# Patient Record
Sex: MALE | Race: WHITE | NOT HISPANIC OR LATINO | Employment: OTHER | URBAN - METROPOLITAN AREA
[De-identification: names, ages, dates, MRNs, and addresses within clinical notes are randomized per-mention and may not be internally consistent; named-entity substitution may affect disease eponyms.]

---

## 2017-11-22 ENCOUNTER — HOSPITAL ENCOUNTER (EMERGENCY)
Facility: MEDICAL CENTER | Age: 52
End: 2017-11-28
Attending: EMERGENCY MEDICINE
Payer: MEDICARE

## 2017-11-22 DIAGNOSIS — R45.850 HOMICIDAL IDEATION: ICD-10-CM

## 2017-11-22 DIAGNOSIS — F23 ACUTE PSYCHOSIS (HCC): ICD-10-CM

## 2017-11-22 LAB — POC BREATHALIZER: 0 PERCENT (ref 0–0.01)

## 2017-11-22 PROCEDURE — 700111 HCHG RX REV CODE 636 W/ 250 OVERRIDE (IP): Performed by: EMERGENCY MEDICINE

## 2017-11-22 PROCEDURE — 99285 EMERGENCY DEPT VISIT HI MDM: CPT

## 2017-11-22 PROCEDURE — 302970 POC BREATHALIZER: Performed by: EMERGENCY MEDICINE

## 2017-11-22 PROCEDURE — 96372 THER/PROPH/DIAG INJ SC/IM: CPT

## 2017-11-22 RX ORDER — HALOPERIDOL 5 MG/ML
5 INJECTION INTRAMUSCULAR ONCE
Status: DISCONTINUED | OUTPATIENT
Start: 2017-11-22 | End: 2017-11-22

## 2017-11-22 RX ORDER — LORAZEPAM 2 MG/ML
2 INJECTION INTRAMUSCULAR ONCE
Status: COMPLETED | OUTPATIENT
Start: 2017-11-22 | End: 2017-11-22

## 2017-11-22 RX ORDER — LORAZEPAM 2 MG/ML
2 INJECTION INTRAMUSCULAR ONCE
Status: DISCONTINUED | OUTPATIENT
Start: 2017-11-22 | End: 2017-11-22

## 2017-11-22 RX ORDER — DIPHENHYDRAMINE HYDROCHLORIDE 50 MG/ML
50 INJECTION INTRAMUSCULAR; INTRAVENOUS ONCE
Status: COMPLETED | OUTPATIENT
Start: 2017-11-22 | End: 2017-11-22

## 2017-11-22 RX ORDER — DIPHENHYDRAMINE HYDROCHLORIDE 50 MG/ML
25 INJECTION INTRAMUSCULAR; INTRAVENOUS ONCE
Status: DISCONTINUED | OUTPATIENT
Start: 2017-11-22 | End: 2017-11-22

## 2017-11-22 RX ORDER — HALOPERIDOL 5 MG/ML
5 INJECTION INTRAMUSCULAR ONCE
Status: COMPLETED | OUTPATIENT
Start: 2017-11-22 | End: 2017-11-22

## 2017-11-22 RX ADMIN — HALOPERIDOL LACTATE 5 MG: 5 INJECTION, SOLUTION INTRAMUSCULAR at 18:30

## 2017-11-22 RX ADMIN — LORAZEPAM 2 MG: 2 INJECTION INTRAMUSCULAR; INTRAVENOUS at 18:30

## 2017-11-22 RX ADMIN — DIPHENHYDRAMINE HYDROCHLORIDE 50 MG: 50 INJECTION INTRAMUSCULAR; INTRAVENOUS at 18:30

## 2017-11-23 LAB
AMPHET UR QL SCN: NEGATIVE
BARBITURATES UR QL SCN: NEGATIVE
BENZODIAZ UR QL SCN: NEGATIVE
BZE UR QL SCN: NEGATIVE
CANNABINOIDS UR QL SCN: POSITIVE
METHADONE UR QL SCN: NEGATIVE
OPIATES UR QL SCN: NEGATIVE
OXYCODONE UR QL SCN: NEGATIVE
PCP UR QL SCN: NEGATIVE
PROPOXYPH UR QL SCN: NEGATIVE

## 2017-11-23 PROCEDURE — 90791 PSYCH DIAGNOSTIC EVALUATION: CPT

## 2017-11-23 PROCEDURE — 700111 HCHG RX REV CODE 636 W/ 250 OVERRIDE (IP): Performed by: PSYCHIATRY & NEUROLOGY

## 2017-11-23 PROCEDURE — 80307 DRUG TEST PRSMV CHEM ANLYZR: CPT

## 2017-11-23 PROCEDURE — 96372 THER/PROPH/DIAG INJ SC/IM: CPT

## 2017-11-23 RX ORDER — DIPHENHYDRAMINE HYDROCHLORIDE 50 MG/ML
50 INJECTION INTRAMUSCULAR; INTRAVENOUS
Status: DISCONTINUED | OUTPATIENT
Start: 2017-11-23 | End: 2017-11-28 | Stop reason: HOSPADM

## 2017-11-23 RX ORDER — RISPERIDONE 2 MG/1
2 TABLET ORAL 2 TIMES DAILY
Status: DISCONTINUED | OUTPATIENT
Start: 2017-11-23 | End: 2017-11-28 | Stop reason: HOSPADM

## 2017-11-23 RX ORDER — LORAZEPAM 2 MG/ML
2 INJECTION INTRAMUSCULAR
Status: DISCONTINUED | OUTPATIENT
Start: 2017-11-23 | End: 2017-11-28

## 2017-11-23 RX ORDER — HALOPERIDOL 5 MG/ML
5 INJECTION INTRAMUSCULAR
Status: DISCONTINUED | OUTPATIENT
Start: 2017-11-23 | End: 2017-11-28 | Stop reason: HOSPADM

## 2017-11-23 RX ADMIN — DIPHENHYDRAMINE HYDROCHLORIDE 50 MG: 50 INJECTION, SOLUTION INTRAMUSCULAR; INTRAVENOUS at 18:05

## 2017-11-23 RX ADMIN — HALOPERIDOL LACTATE 5 MG: 5 INJECTION, SOLUTION INTRAMUSCULAR at 18:05

## 2017-11-23 RX ADMIN — LORAZEPAM 2 MG: 2 INJECTION INTRAMUSCULAR; INTRAVENOUS at 18:05

## 2017-11-23 NOTE — ED NOTES
"Patient arrives from [facility] via  deputies under legal 2000 for homicidal ideation. Per PRISCA, patient is verbally threatening to kill and shoot everyone. Patient was released from [facility] today and brought directly to [facility].     Patient is agitated and uncooperative. Verbally threatening to harm staff. Patient appears to be paranoid and is not making sense on arrival. Patient states \"you stole my identity\".   "

## 2017-11-23 NOTE — DISCHARGE PLANNING
Alert team note:  Patient is very delusional.  Legal hold extended by psychiatry.  Awaiting transfer to inpatient psychiatric hospital.

## 2017-11-23 NOTE — PROGRESS NOTES
Patient's home medications have been reviewed by the pharmacy team.     Patient's Medications    No medications on file         A:  Medications do not appear to be contributing to current complaints.   No meds per notes.    P:    No recommendations at this time. Defer to psychiatry.    Rajan Quezada, PharmD, BCPS
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
## 2017-11-23 NOTE — ED NOTES
"At approximately 1440 Tomy was offered a shower and expressed a desire for one. Upon getting to the shower in blue pod and providing Tomy with toiletries, clean garments and towels, and instructions he proceeded to pull the assistance cord while we were both in the bathroom. When asked why he pulled the help cord he replied, \"because I can. I'll do what I want.\" He was advised that such behavior would not be tolerated if he wanted a shower and that if he would not cooperate with us he would have to return to his room. He then responded that he didn't care and redressed himself in a gown, at which point he was escorted back to [place] [address] without having received a shower.   "

## 2017-11-23 NOTE — ED PROVIDER NOTES
ED Provider Note    HPI: Patient is a 52-year-old male who presented to the emergency department in custody of law enforcement. Patient is under legal hold 1st homicidal ideation. Patient has a previous history of psychosis. He is not taking any medicines at present. Patient refused to give any further history    Review of Systems: Cannot be obtained due to presenting condition    Past medical/surgical history: Psychosis    Medications:  Patient refused to answer    Allergies: None    Social History: Patient refused to answer      Physical exam: Constitutional: Slender male agitated  Vital signs:    EYES: PERRL, EOMI, Conjunctivae and sclera normal, eyelids normal bilaterally.  Neck: Trachea midline. No cervical masses seen or palpated. Normal range of motion, supple. No meningeal signs elicited.  Cardiac: Regular rate and rhythm. S1-S2 present. No S3 or S4 present. No murmurs, rubs, or gallops heard. No edema or varicosities were seen.   Lungs: Clear to auscultation with good aeration throughout. No wheezes, rales, or rhonchi heard. Patient's chest wall moved symmetrically with each respiratory effort. Patient was not making use of accessory muscles of respiration in breathing.  Abdomen: Soft nontender to palpation. No rebound or guarding elicited. No organomegaly identified. No pulsatile abdominal masses identified.   Musculoskeletal:  no  pain with palpitation or movement of muscle, bone or joint , no obvious musculoskeletal deformities identified.  Neurologic: alert and awake answers questions appropriately. Moves all four extremities independently, no gross focal abnormalities identified. Normal strength and motor.  Skin: no rash or lesion seen, no palpable dermatologic lesions identified.  Psychiatric: Agitated belligerent and threatening to kill everybody    Medical decision making: Patient was extremely agitated and threatening towards staff. He was unable to cooperate with the nursing staff and refused to  take any oral medications. For patient and staff safety patient given Haldol and Ativan and diphenhydramine (I reviewed the chart from previous emergency department visits for similar complaints and he had responded well to this medication regimen in the past)    Breathalyzer obtained; no evidence of acute alcohol intoxication tox screen pending    [employer] consulted; patient is a danger to himself and others due to psychiatric illness. He is not suitable for discharge. He'll be transferred to an inpatient psychiatric facility    Impression acute psychosis  2) homicidal ideation

## 2017-11-23 NOTE — ED PROVIDER NOTES
ED Provider Note    1:26 PM the patient has done well during my part of observation. There've been no issues. He continues to await transfer to a psychiatric facility. I anticipate evaluation today by psychiatry

## 2017-11-23 NOTE — ED NOTES
Patient resting in bed. No longer verbally threatening staff. Security remains at bedside due to HI.
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
## 2017-11-23 NOTE — DISCHARGE PLANNING
Medical Social Work    Referral: Legal Hold    Intervention: Legal Hold Paperwork given to SW by [employer] RN: Maryanne Gibbons    Legal Hold Initiated: Date: 11-22-17  Time: 1400    Legal Hold faxed: Date: 11-23-17  Time: 0500    Patient’s Insurance Listed on Face Sheet: None    Referrals sent to: [facility]    Plan: Patient will transfer to mental health facility once acceptance is obtained.

## 2017-11-23 NOTE — ED NOTES
Patient sleeping in bed. Security remains at bedside w/ direct line of sight. Respirations even and unlabored.

## 2017-11-24 NOTE — PROGRESS NOTES
Patient remains on hospital bed, resting with eyes closed. Pt lying on right side. Pt does not display any signs of discomfort or distress. Respirations equal and unlabored. Pt under constant supervision of 1:1 sitter. Continue to monitor.

## 2017-11-24 NOTE — ED NOTES
This RN received report from day shift RN. At this time patient is resting with eyes closed, respirations equal and unlabored. Continue to monitor.

## 2017-11-24 NOTE — ED PROVIDER NOTES
ED Provider Note  11/24. This is a 52-year-old male who is awaiting transfer to psychiatric facility. Currently is pleasant, cooperative, no complaints, his vital signs have remained normal except occasionally pulse is over 100.

## 2017-11-24 NOTE — PROGRESS NOTES
Pt continues to rest with eyes closed and no signs of discomfort or distress. Respirations remain equal and unlabored. Pt now lying on his back. Pt remains under constant supervision of 1:1 sitter. Continue to monitor.

## 2017-11-24 NOTE — DISCHARGE PLANNING
"ALERT note:  Mr eDsai continues to cover his head with the blanket while communicating.  \"I would appreciate a lack of deception\"......same focus/perseveration of trust/honesty.  He is still awaiting psychiatric hospital placement.  "

## 2017-11-24 NOTE — ED NOTES
Pt resting with eyes closed. Pt resting on hospital bed, lying on his back. Pt does not display any signs of discomfort or distress. Equal chest rise and fall. Pt remains under constant observation of 1:1 sitter. Continue to monitor.

## 2017-11-24 NOTE — DISCHARGE PLANNING
Alert team note:  Patient was assessed again today.  Patient continues to be irritable and very psychotic, wanting kill everyone.  Said the food was terrible and asking for something else.  Legal hold extended by psychiatry.  Awaiting transfer to an inpatient psychiatry hospital.

## 2017-11-24 NOTE — PSYCHIATRY
"PSYCHIATRIC FOLLOW UP:    Reason for Admission: from care home via  deputies under legal 2000 for homicidal ideation. Per PRISCA, patient is verbally threatening to kill and shoot everyone. Patient was released from care home today and brought directly to [facility]...... paranoid and is not making sense on arrival. Patient states \"you stole my identity\".     Legal hold status:+        Refused meds. Says his real name is \"Alexandr Soto\", there are a lot of Alexandr Soto's. Something else which was non sensical. Would like a shower, nails trimmed, beard shaved. This was tried earlier am but while in the shower he pulled a cord, became agitated and hostile. Prn'd. Asked why, \"because I can do what I want\".    Psychiatric Examination: observed phenomenon:  Vitals:Blood pressure 116/84, pulse 88, temperature 37.1 °C (98.7 °F), resp. rate 18, height 1.702 m (5' 7\"), weight 70.3 kg (155 lb), SpO2 97 %.  Musculoskeletal(abnormal movements, gait, etc): none noted but gait not observed.  Appearance:rendon, fair hygiene. Good eye contact until he covered his head (again)and refused to participate further.  Thoughts:  psychotic  Speech: minimal    Mood: not identified  Affect: blunted and not willing to cooperate  SI/HI: no answer  Attention/Alertness:  intact   Memory: grossly intact  Orientation: grossly intact  Fund of Knowledge: unable to explore  Insight/Judgement into symptoms: none     Assessment:(acuity level)  Psychotic Disorder Unsepc: highly likely to be schizophrenia. He does not show excess motor activity, verbal activity, pressured speech. R/O substance induced.          Plan:no changes.   legal hold:extended  Anticipated F/U: within 48 hours.     Will follow             "

## 2017-11-24 NOTE — PROGRESS NOTES
Patient requesting breakfast. This RN educated patient that breakfast trays will be coming in few hours. Patient given lunch box and water to drink. Pt calm and cooperative.

## 2017-11-24 NOTE — PROGRESS NOTES
Patient remains on hospital bed, resting with eyes closed. Pt lying right side. Pt does not display any signs of discomfort or distress. Respirations equal and unlabored. Pt under constant supervision of 1:1 sitter. Continue to monitor.

## 2017-11-24 NOTE — ED NOTES
"Pt demanding \"real breakfast\"; \"I don't know what birds you have been feeding here, but I can't eat just bread.  I need something else!\"  "

## 2017-11-24 NOTE — ED NOTES
Pt continues to rest with eyes closed and no signs of discomfort or distress. Respirations remain equal and unlabored. Pt now lying his back. Pt remains under constant supervision of this RN, ER tech and . Continue to monitor.

## 2017-11-24 NOTE — DISCHARGE PLANNING
Medical Social Work  SW completed and faxed Legal Hold Extention request to Randa Wayne office.  Message left for Gabriela Preston to notify of paperwork.

## 2017-11-24 NOTE — ED NOTES
"Pt yelling out of the room that he wants his discharge paperwork. Pt pacing in room and is agitated. Security called to bedside, states the staff are all \"rapists\" and states we are injecting him \"with trace elements\". Pt cooperative with IM injections.   "

## 2017-11-24 NOTE — DISCHARGE PLANNING
MSW spoke with Chan at [facility] who stated that they are full today. Possible discharges tomorrow morning.

## 2017-11-24 NOTE — ED NOTES
Patient is responsive to verbal stimuli. Patient is calm and cooperative at this time. Patient continues to rest with eyes closed. Continue to monitor.

## 2017-11-24 NOTE — ED NOTES
Patient ambulated to and from bathroom. Pt does not display any distress. Pt calm and cooperative.

## 2017-11-25 PROCEDURE — 700111 HCHG RX REV CODE 636 W/ 250 OVERRIDE (IP): Performed by: PSYCHIATRY & NEUROLOGY

## 2017-11-25 PROCEDURE — 96372 THER/PROPH/DIAG INJ SC/IM: CPT

## 2017-11-25 RX ADMIN — HALOPERIDOL LACTATE 5 MG: 5 INJECTION, SOLUTION INTRAMUSCULAR at 21:31

## 2017-11-25 RX ADMIN — DIPHENHYDRAMINE HYDROCHLORIDE 50 MG: 50 INJECTION, SOLUTION INTRAMUSCULAR; INTRAVENOUS at 21:31

## 2017-11-25 RX ADMIN — LORAZEPAM 2 MG: 2 INJECTION INTRAMUSCULAR; INTRAVENOUS at 21:31

## 2017-11-25 NOTE — DISCHARGE PLANNING
"Pt laying quietly in bed.  Pleasant upon approach.  Good eye contact.  Denies suicidal and homicidal ideation; however, when asked about the reason he was brought into the hospital, pt stated \"I do not know why.\"  When questioned about prior homicidal ideation, pt responded, \" Do you know what national emergency within the police department is?\" Pt furthered explained, \"it is when a situation occurs and a execution needs to take place.  That can either be done by firing squad or hanging.  All you have to remember is court marshal.\"   When asked about previous psychiatric hospitalization, Pt responded, \"no never.\"  Pt started to become paranoid with further questions, stating , \"lets keep that information just between us. That's all you need to know.\"  Pt agreed to inform staff if he felt homicidal or suicidal. Pt still awaiting placement into a inpatient psychiatric hospital.  Will continue to be monitored 1:1 with patient sitter for safety.    "

## 2017-11-25 NOTE — ED NOTES
"Pt yelling in room, blanket over face. Checked on pt, yelled at me to \"get out\".  Asked not to yell. Will cont to monitor.  "

## 2017-11-25 NOTE — ED NOTES
"Pt refused BKFST,  Pt refuses risperidone.  \" I don't take risperidone!. breakfst was just bread, that has no nutritional value, just send me back to longterm, I'm tired of this B---S---\".  "

## 2017-11-25 NOTE — ED NOTES
Pt yelling in room; speaking loudly to sitter about not receiving lunch; pt told to keep voice down by Rn, only warning

## 2017-11-25 NOTE — ED NOTES
"Pt requesting something for sleep, PRN ativan brought to pt, pt states \"pshh, take your aspirin and get out of here.\" Re-stated that medicated was ativan, pt stated \"I know what that is, that's just aspirin\" and waved nurse away stating \"this is bullshit.\" Pt laid back down on gurney.   "

## 2017-11-25 NOTE — ED PROVIDER NOTES
"ED PROVIDER NOTE    Scribed for Rodolfo Baldwin M.D. by Jody Guadarrama. 11/25/2017, 12:33 PM.    This is an addendum to the note on Tomy Desai. For further details and full chart entry, see the previously signed ED Provider Note written by Dr. Delatorre ([facility]).      12:33 PM Patient reevaluated at bedside. Patient has no complaints at this time. His last set of vitals were: /89   Pulse 80   Temp 36.7 °C (98.1 °F)   Resp 16   Ht 1.702 m (5' 7\")   Wt 70.3 kg (155 lb)   SpO2 96%   BMI 24.28 kg/m²      Patient is awake alert without any complaints still awaiting transfer to a psychiatric facility.      IJody (Scribe), am scribing for, and in the presence of, Rodolfo Baldwin M.D..  Electronically signed by: Jody Guadarrama (Scribe), 11/25/2017  IRodolfo M.D. personally performed the services described in this documentation, as scribed by Jody Guadarrama in my presence, and it is both accurate and complete.    The note accurately reflects work and decisions made by me.  Rodolfo Baldwin  11/25/2017  1:33 PM        "

## 2017-11-25 NOTE — ED NOTES
Pt sleeping on gurney, visible rise and fall of chest, sitter in direct observation outside of room

## 2017-11-26 PROCEDURE — 96372 THER/PROPH/DIAG INJ SC/IM: CPT

## 2017-11-26 PROCEDURE — 700111 HCHG RX REV CODE 636 W/ 250 OVERRIDE (IP): Performed by: PSYCHIATRY & NEUROLOGY

## 2017-11-26 RX ADMIN — HALOPERIDOL LACTATE 5 MG: 5 INJECTION, SOLUTION INTRAMUSCULAR at 20:22

## 2017-11-26 RX ADMIN — LORAZEPAM 2 MG: 2 INJECTION INTRAMUSCULAR; INTRAVENOUS at 20:22

## 2017-11-26 RX ADMIN — DIPHENHYDRAMINE HYDROCHLORIDE 50 MG: 50 INJECTION, SOLUTION INTRAMUSCULAR; INTRAVENOUS at 20:22

## 2017-11-26 ASSESSMENT — PAIN SCALES - GENERAL: PAINLEVEL_OUTOF10: 0

## 2017-11-26 ASSESSMENT — LIFESTYLE VARIABLES: DO YOU DRINK ALCOHOL: NO

## 2017-11-26 NOTE — ED NOTES
Patient appears to be sleeping in bed. Respirations even and unlabored. No s/s of acute distress.
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
## 2017-11-26 NOTE — ED NOTES
"Patient refused PM medications stating, \"Don't you know there's a law suit out against that? I don't take any medications ever, now get the fuck out of my room you piece of shit!\"  "

## 2017-11-26 NOTE — ED NOTES
PRN sedatives administered w/ 2 RNs and 3 security officers assisting. Patient continues to get OOB and press CODE BLUE button on wall.

## 2017-11-26 NOTE — ED NOTES
Patient agitated and hitting code blue button on wall screaming at staff. Security called to assist administration of PRN sedatives.
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
## 2017-11-26 NOTE — ED PROVIDER NOTES
ED Provider Note    Patient is resting quietly at this time, and awaits transfer to psychiatric facility.

## 2017-11-27 ASSESSMENT — PAIN SCALES - GENERAL: PAINLEVEL_OUTOF10: 0

## 2017-11-27 NOTE — ED NOTES
Patient sleeping w/ visible rise and fall of chest; even unlabored respirations. Patient in direct view of nurse's station.

## 2017-11-27 NOTE — DISCHARGE PLANNING
S) this is not hospital, you are holding me against my will.    O) patient referencing multiple conspiracies keeping him in hospital.  Angry and refusing to speak more, telling staff to leave room.  A) psychotic  P) continue hold.

## 2017-11-27 NOTE — ED PROVIDER NOTES
ED Provider Note    Date and Time Temp Temp Source Pulse Heart Rate (Monitored) Resp BP NIBP SpO2 Room air O2 Patient BP Position BP Location O2 (LPM) O2 Delivery 5 User   11/27/17 1056 36.7 °C (98.1 °F) -- 87 -- 12 101/65 -- 96 % -- -- -- -- -- -- MTA   11/27/17 0614 36.9 °C (98.5 °F) Temporal 88 -- 16  99/68 -- 96 % -- Lying Left Side Right;Upper Arm -- -- -- ASK     Patient did become somewhat agitated here. He did not require any chemical restraints. We are still awaiting acceptance at a psychiatric facility

## 2017-11-27 NOTE — ED NOTES
"Upon walking into patient's room patient states \"speaking of white supremacists you're right on time, get the fuck out\". Patient laying w/ blanket over self, not attempting to get out of bed but verbally aggressive and uncooperative. Will continue to monitor; patient in direct view of nurse's station.   "

## 2017-11-27 NOTE — ED NOTES
Patient resting comfortably; VSS at this time. Patient cooperative with taking VS; denies further needs at this time.

## 2017-11-27 NOTE — ED NOTES
Pt requesting shower, security called; states will attempt to take pt for shower at 1400 when staff available

## 2017-11-27 NOTE — ED NOTES
"Pt amb to desk, demanding to be placed under Alexandr Soto, states \"my dinner ticket better say Alexandr Soto from now on\". Pt amb back to room  "

## 2017-11-27 NOTE — ED NOTES
Pt refusing to take scheduled 2100 medications, but agreed to vital signs. Patient then walked out of room and started yelling and cussing. Patient medicated per MAR for agitation w/ security, ED Tech, and RN at the bedside. Security monitoring patient who is laying in bed calmly at this time.

## 2017-11-27 NOTE — ED NOTES
Patient sleeping w/ visible rise and fall of chest; even unlabored respirations observed. Patient in direct view of nurse's station.

## 2017-11-27 NOTE — ED NOTES
Pt standing in door of room shouting that he better have a razor to shower, security instructed pt to remain in room and stop shouting, pt cussed security out and went back into room, shutting door.

## 2017-11-27 NOTE — ED NOTES
Patient ambulatory to restroom and back to room; patient requests food and drink; Patient given box of food and water per request. Patient calm and cooperative at this time.

## 2017-11-28 VITALS
BODY MASS INDEX: 24.33 KG/M2 | WEIGHT: 155 LBS | OXYGEN SATURATION: 96 % | SYSTOLIC BLOOD PRESSURE: 122 MMHG | HEIGHT: 67 IN | HEART RATE: 72 BPM | TEMPERATURE: 98.4 F | RESPIRATION RATE: 16 BRPM | DIASTOLIC BLOOD PRESSURE: 86 MMHG

## 2017-11-28 PROCEDURE — 96372 THER/PROPH/DIAG INJ SC/IM: CPT

## 2017-11-28 PROCEDURE — 700111 HCHG RX REV CODE 636 W/ 250 OVERRIDE (IP): Performed by: PSYCHIATRY & NEUROLOGY

## 2017-11-28 RX ORDER — BENZTROPINE MESYLATE 1 MG/ML
1 INJECTION INTRAMUSCULAR; INTRAVENOUS 2 TIMES DAILY PRN
Status: DISCONTINUED | OUTPATIENT
Start: 2017-11-28 | End: 2017-11-28 | Stop reason: HOSPADM

## 2017-11-28 RX ADMIN — LORAZEPAM 2 MG: 2 INJECTION INTRAMUSCULAR; INTRAVENOUS at 00:48

## 2017-11-28 RX ADMIN — DIPHENHYDRAMINE HYDROCHLORIDE 50 MG: 50 INJECTION, SOLUTION INTRAMUSCULAR; INTRAVENOUS at 02:48

## 2017-11-28 RX ADMIN — LORAZEPAM 2 MG: 2 INJECTION INTRAMUSCULAR; INTRAVENOUS at 02:48

## 2017-11-28 ASSESSMENT — PAIN SCALES - GENERAL
PAINLEVEL_OUTOF10: 0
PAINLEVEL_OUTOF10: 0

## 2017-11-28 NOTE — ED NOTES
REMSA at bedside to take pt to [facility].  PT's one bag of belongings with pt.

## 2017-11-28 NOTE — DISCHARGE PLANNING
"Pt laying in bed.  Denies suicidal ideation.  Labile mood.  Reports sleeping on and off but no \"real rest.\"  Speech clear.  Good eye contact.  Continues to be paranoid.  Fixation on government and feels he must get revenge.  Still awaiting placement to inpatient hospital. Will continue 1:1 observation for safety.    "

## 2017-11-28 NOTE — ED PROVIDER NOTES
ED Provider Note Addendum    Scribed for Tay Salcedo M.D. by Moises Kent on 11/28/2017 at 11:06 AM.     This is an addendum to the note on Tomy Desai.  For further details and full chart information, see patient's initial note.       4;00 AM - I discussed the patient's case with Dr. Tolentino ([facility]) who will transfer care of the patient to me at this time.        11:07 AM  - Patient evaluated by myself.  Patient is resting comfortably at this time with no complaints.    Disposition:  Patient will be transferred to an appropriate psychiatric facility in stable condition for further psychiatric care and evaluation.  Patient will be placed under the care of my partner awaiting transfer.    FINAL IMPRESSION  1. Acute psychosis    2. Homicidal ideation         IMoises (Scribe), am scribing for, and in the presence of, Tay Salcedo M.D..    Electronically signed by: Moises Kent (Scribe), 11/28/2017    ITay M.D. personally performed the services described in this documentation, as scribed by Moiess Kent in my presence, and it is both accurate and complete.    The note accurately reflects work and decisions made by me.  Tay Salcedo  11/28/2017  11:24 AM

## 2017-11-28 NOTE — ED NOTES
Patient to nurses station with multiple requests.  Given water.  Given blanket.  Asked for food, repeatedly.  Informed patient on plan of care and time of night.

## 2017-11-28 NOTE — ED NOTES
"PT demanding more food, pt updated that he will not be getting \"extra meals\" at this time.  PT then stated \"you are required to feed me 2000 calories 3 times a day and that breakfast was way less than 2000 calories\"    "

## 2017-11-28 NOTE — PSYCHIATRY
PSYCHIATRIC FOLLOW UP:    Reason for Admission:   Legal hold status:     Psychiatric Supervising Attending:         HPI:       Brought from detention for HI. Had been threatening to shoot and kill everyone. After introducing myself he immediately starts screaming and calling me a quack. Paranoid. Refusing medications, but requiring haldol prns. Not redirectable. Delusional.     Psychiatric Examination: observed phenomenon:  Vitals:   Vitals:    11/27/17 0201 11/27/17 0614 11/27/17 1056 11/27/17 1419   BP: 121/54 (!) 99/68 101/65 124/90   Pulse: 87 88 87 89   Resp: 16 16 12 15   Temp: 36.5 °C (97.7 °F) 36.9 °C (98.5 °F) 36.7 °C (98.1 °F)    TempSrc:   Temporal    SpO2: 96% 96% 96% 96%   Weight:       Height:           Musculoskeletal(abnormal movements, gait, etc): psychomotor agitation   Appearance: hygiene fair   Thoughts: psychotic   Speech: disorganized   Mood:    Angry   Affect:    Labile   SI/HI:   did't answer   Attention/Alertness:   Poor attention   Memory:    Grossly intact.   Orientation:    Grossly intact.   Fund of Knowledge:      Insight/Judgement into symptoms poor  Neurological Testing:( ie clock, cube drawing, MMSE, MOCA,etc.)         Assessment:(acuity level)  Psychotic disorder unspecified           Plan:    Not taking pych meds, so no point in increasing.      Continue haldol prns  Transfer to psychiatric hospital when bed available.

## 2017-11-28 NOTE — ED NOTES
"Attempted to medicate pt, pt refused.  PT stated \"there must be some mistake I do not take medication.  I refuse to take anything that is not a sleeping medication\"  "

## 2017-11-28 NOTE — ED NOTES
"Pt walked from the bathroom, gait steady, yelling at anyone in sight. Called the security guards \"knuckleheads.\"  Lying back in bed, security on standby.  "

## 2017-11-28 NOTE — ED NOTES
"Pt standing in the window, without clothing, intermittently yelling outside the door about \"classifications.\"  "
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
## 2017-11-28 NOTE — ED NOTES
"Patient requesting something to \"help me sleep. Like a little birdie bath treat.\"  Medicated with Ativan.  Patient cooperative.  Sitter outside doorway.    "

## 2017-11-28 NOTE — DISCHARGE PLANNING
Pt has been accepted to [facility] by Dr. Faustin.  Teri called [facility] and set up transport through [place] for 1500.  Sw updated [facility], bedside rn, and completed transfer packet.

## 2017-11-29 NOTE — DISCHARGE PLANNING
Medical Social Work    Received pt's legal hold extension from 's office; copy faxed to [facility] and copy placed in medical records folder.

## 2018-08-22 ENCOUNTER — HOSPITAL ENCOUNTER (EMERGENCY)
Dept: HOSPITAL 8 - ED | Age: 53
Discharge: HOME | End: 2018-08-22
Payer: COMMERCIAL

## 2018-08-22 VITALS — SYSTOLIC BLOOD PRESSURE: 142 MMHG | DIASTOLIC BLOOD PRESSURE: 111 MMHG

## 2018-08-22 VITALS — HEIGHT: 69 IN | BODY MASS INDEX: 20.9 KG/M2 | WEIGHT: 141.1 LBS

## 2018-08-22 DIAGNOSIS — Z77.21: Primary | ICD-10-CM

## 2018-08-22 PROCEDURE — 87340 HEPATITIS B SURFACE AG IA: CPT

## 2018-08-22 PROCEDURE — 36415 COLL VENOUS BLD VENIPUNCTURE: CPT

## 2018-08-22 PROCEDURE — 86803 HEPATITIS C AB TEST: CPT

## 2018-08-22 PROCEDURE — G0475 HIV COMBINATION ASSAY: HCPCS

## 2018-08-22 PROCEDURE — 86706 HEP B SURFACE ANTIBODY: CPT

## 2018-08-22 PROCEDURE — 86705 HEP B CORE ANTIBODY IGM: CPT

## 2018-08-22 PROCEDURE — 99284 EMERGENCY DEPT VISIT MOD MDM: CPT

## 2018-08-22 PROCEDURE — 87806 HIV AG W/HIV1&2 ANTB W/OPTIC: CPT

## 2020-04-03 ENCOUNTER — HOSPITAL ENCOUNTER (EMERGENCY)
Facility: MEDICAL CENTER | Age: 55
End: 2020-04-05
Attending: EMERGENCY MEDICINE
Payer: MEDICARE

## 2020-04-03 LAB — ETHANOL BLD-MCNC: <10.1 MG/DL (ref 0–10.1)

## 2020-04-03 PROCEDURE — 700111 HCHG RX REV CODE 636 W/ 250 OVERRIDE (IP): Performed by: EMERGENCY MEDICINE

## 2020-04-03 PROCEDURE — 96375 TX/PRO/DX INJ NEW DRUG ADDON: CPT

## 2020-04-03 PROCEDURE — 700101 HCHG RX REV CODE 250

## 2020-04-03 PROCEDURE — 304217 HCHG IRRIGATION SYSTEM

## 2020-04-03 PROCEDURE — 90791 PSYCH DIAGNOSTIC EVALUATION: CPT

## 2020-04-03 PROCEDURE — 304999 HCHG REPAIR-SIMPLE/INTERMED LEVEL 1

## 2020-04-03 PROCEDURE — 99285 EMERGENCY DEPT VISIT HI MDM: CPT

## 2020-04-03 PROCEDURE — 80307 DRUG TEST PRSMV CHEM ANLYZR: CPT

## 2020-04-03 PROCEDURE — 303747 HCHG EXTRA SUTURE

## 2020-04-03 PROCEDURE — 96374 THER/PROPH/DIAG INJ IV PUSH: CPT

## 2020-04-03 PROCEDURE — 302970 POC BREATHALIZER: Performed by: EMERGENCY MEDICINE

## 2020-04-03 RX ORDER — LIDOCAINE HYDROCHLORIDE AND EPINEPHRINE 10; 10 MG/ML; UG/ML
INJECTION, SOLUTION INFILTRATION; PERINEURAL
Status: COMPLETED
Start: 2020-04-03 | End: 2020-04-03

## 2020-04-03 RX ORDER — HALOPERIDOL 5 MG/ML
5 INJECTION INTRAMUSCULAR ONCE
Status: COMPLETED | OUTPATIENT
Start: 2020-04-03 | End: 2020-04-03

## 2020-04-03 RX ORDER — DIPHENHYDRAMINE HYDROCHLORIDE 50 MG/ML
50 INJECTION INTRAMUSCULAR; INTRAVENOUS ONCE
Status: COMPLETED | OUTPATIENT
Start: 2020-04-03 | End: 2020-04-03

## 2020-04-03 RX ORDER — LORAZEPAM 2 MG/ML
2 INJECTION INTRAMUSCULAR ONCE
Status: COMPLETED | OUTPATIENT
Start: 2020-04-03 | End: 2020-04-03

## 2020-04-03 RX ADMIN — HALOPERIDOL LACTATE 5 MG: 5 INJECTION, SOLUTION INTRAMUSCULAR at 18:37

## 2020-04-03 RX ADMIN — DIPHENHYDRAMINE HYDROCHLORIDE 50 MG: 50 INJECTION INTRAMUSCULAR; INTRAVENOUS at 18:37

## 2020-04-03 RX ADMIN — LIDOCAINE HYDROCHLORIDE,EPINEPHRINE BITARTRATE: 10; .01 INJECTION, SOLUTION INFILTRATION; PERINEURAL at 14:15

## 2020-04-03 RX ADMIN — LORAZEPAM 2 MG: 2 INJECTION INTRAMUSCULAR; INTRAVENOUS at 17:58

## 2020-04-03 NOTE — ED TRIAGE NOTES
"BIB EMS and PD. Per EMS pt reported he had not slept in 3 days, was hearing voices and inflicted self harm by cutting right knuckles with glass. In addition PD reported pt has homicidal ideation and was acting aggressively toward PD and EMS. Received 300 IM ketamine and 2 IV Versed PTA. Pt arrived in 4 point restraints.     Chart up for ERP    /103   Pulse (!) 102   Temp 37 °C (98.6 °F) (Temporal)   Resp 14   Ht 1.702 m (5' 7\")   Wt 70.8 kg (156 lb)   SpO2 92%   BMI 24.43 kg/m²     "

## 2020-04-03 NOTE — ED NOTES
"Pt yelling, cursing at staff, \"Fuck you. Suck my sumeet.\" Verbally abusive to staff, delusional at times.Continues to spit on mayo, self and bed. Pt remains in restraints. Sitter in lone of sight.  "

## 2020-04-03 NOTE — ED PROVIDER NOTES
ED Provider Note    CHIEF COMPLAINT  Chief Complaint   Patient presents with   • Legal 2000   • Delusional       HPI  Tomy Desai is a 54 y.o. male who presents to the emergency department with complaint of suicidal behavior, combative behavior.  The patient was found in the bathroom, broke glass and try to cut himself instead he went to kill himself.  EMS was called, the patient received ketamine and Versed in route secondary to his agitation.  Here in the emergency department, he is screaming at us, he is in four-point restraints he is stating that he wants to kill himself.  Denies chest pain, nausea, vomiting    REVIEW OF SYSTEMS  Difficult to obtain secondary to agitation    PAST MEDICAL HISTORY  No past medical history on file.    FAMILY HISTORY  Noncontributory    SOCIAL HISTORY  Social History     Socioeconomic History   • Marital status: Single     Spouse name: Not on file   • Number of children: Not on file   • Years of education: Not on file   • Highest education level: Not on file   Occupational History   • Not on file   Social Needs   • Financial resource strain: Not on file   • Food insecurity     Worry: Not on file     Inability: Not on file   • Transportation needs     Medical: Not on file     Non-medical: Not on file   Tobacco Use   • Smoking status: Not on file   Substance and Sexual Activity   • Alcohol use: Not on file   • Drug use: Not on file   • Sexual activity: Not on file   Lifestyle   • Physical activity     Days per week: Not on file     Minutes per session: Not on file   • Stress: Not on file   Relationships   • Social connections     Talks on phone: Not on file     Gets together: Not on file     Attends Congregation service: Not on file     Active member of club or organization: Not on file     Attends meetings of clubs or organizations: Not on file     Relationship status: Not on file   • Intimate partner violence     Fear of current or ex partner: Not on file     Emotionally  "abused: Not on file     Physically abused: Not on file     Forced sexual activity: Not on file   Other Topics Concern   • Not on file   Social History Narrative   • Not on file       SURGICAL HISTORY  No past surgical history on file.    CURRENT MEDICATIONS  Home Medications    **Home medications have not yet been reviewed for this encounter**         ALLERGIES  No Known Allergies    PHYSICAL EXAM  VITAL SIGNS: /103   Pulse (!) 102   Temp 37 °C (98.6 °F) (Temporal)   Resp 14   Ht 1.702 m (5' 7\")   Wt 70.8 kg (156 lb)   SpO2 92%   BMI 24.43 kg/m²      Constitutional: Well developed, Well nourished, mild distress, Non-toxic appearance.   Eyes: PERRLA, EOMI, Conjunctiva normal, No discharge.   Cardiovascular: Normal heart rate, Normal rhythm, No murmurs, No rubs, No gallops, and intact distal pulses.   Thorax & Lungs:  No respiratory distress, no rales, no rhonchi, No wheezing, No chest wall tenderness.   Abdomen: Bowel sounds normal, Soft, No tenderness, No guarding, No rebound, No pulsatile masses.   Skin: Laceration to the extensor surface of the index finger as well as ring finger at the base of the phalanx, no tendon involvement, no active bleeding  Extremities: Lacerations as above   neurologic: Alert & oriented x 3, agitated yet responding to verbal stimulus, occasionally acting out and screaming, sensation and strength intact upper lower extremes bilaterally   psychiatric: Agitated, anxious    Results for orders placed or performed during the hospital encounter of 04/03/20   DIAGNOSTIC ALCOHOL   Result Value Ref Range    Diagnostic Alcohol <10.1 0.0 - 10.1 mg/dL       Laceration Repair Procedure Note    Indication: Laceration    Procedure: The patient was placed in the appropriate position and anesthesia around the laceration was obtained by infiltration using 1% Lidocaine with epinephrine. The area was then irrigated with normal saline. The laceration was closed with 4-0 Ethilon using interrupted " sutures. A second laceration was closed with 4-0 Ethilon using interrupted sutures. The wound area was then dressed with a sterile dressing.      Total repaired wound length: 2.5 cm.     Other Items: Suture count: 6    The patient tolerated the procedure well.    Complications: None      COURSE & MEDICAL DECISION MAKING  Pertinent Labs & Imaging studies reviewed. (See chart for details)  This is a 54-year-old male well-known to us for his psychosis.  Here in the emergency department, the patient extremely agitated.  He received Ativan 2 mg, Haldol 5 mg as well as Benadryl 50 mg IV.  The patient became more sedate.  He has been threatening, spitting and very uncooperative therefore he is been placed in hard restraints.  Nu with life skills has evaluate the patient I do believe the patient requires further evaluation and management and transfer to a local psychiatric facility.  The patient is medically stable this point for transfer.    FINAL IMPRESSION  Acute psychosis  Hand lacerations      Electronically signed by: Evan Bejarano D.O., 4/3/2020 2:37 PM

## 2020-04-03 NOTE — ED NOTES
"Pt impulsive, yelling out. Pt denies SI/HI at present. Pt states \"why would I want to hurt myself? Why would I do that?\" Sitter in line of sight.  "

## 2020-04-04 PROCEDURE — 99284 EMERGENCY DEPT VISIT MOD MDM: CPT | Mod: GC | Performed by: PSYCHIATRY & NEUROLOGY

## 2020-04-04 RX ORDER — HALOPERIDOL 5 MG/ML
5 INJECTION INTRAMUSCULAR
Status: DISCONTINUED | OUTPATIENT
Start: 2020-04-04 | End: 2020-04-06 | Stop reason: HOSPADM

## 2020-04-04 RX ORDER — HALOPERIDOL 5 MG/1
5 TABLET ORAL
Status: DISCONTINUED | OUTPATIENT
Start: 2020-04-04 | End: 2020-04-06 | Stop reason: HOSPADM

## 2020-04-04 RX ORDER — RISPERIDONE 2 MG/1
2 TABLET ORAL 2 TIMES DAILY
Status: DISCONTINUED | OUTPATIENT
Start: 2020-04-04 | End: 2020-04-06 | Stop reason: HOSPADM

## 2020-04-04 RX ORDER — LORAZEPAM 2 MG/1
2 TABLET ORAL
Status: DISCONTINUED | OUTPATIENT
Start: 2020-04-04 | End: 2020-04-06 | Stop reason: HOSPADM

## 2020-04-04 RX ORDER — DIPHENHYDRAMINE HYDROCHLORIDE 50 MG/ML
50 INJECTION INTRAMUSCULAR; INTRAVENOUS
Status: DISCONTINUED | OUTPATIENT
Start: 2020-04-04 | End: 2020-04-06 | Stop reason: HOSPADM

## 2020-04-04 RX ORDER — LORAZEPAM 2 MG/ML
2 INJECTION INTRAMUSCULAR
Status: DISCONTINUED | OUTPATIENT
Start: 2020-04-04 | End: 2020-04-06 | Stop reason: HOSPADM

## 2020-04-04 RX ORDER — DIPHENHYDRAMINE HCL 25 MG
50 TABLET ORAL
Status: DISCONTINUED | OUTPATIENT
Start: 2020-04-04 | End: 2020-04-06 | Stop reason: HOSPADM

## 2020-04-04 NOTE — ED NOTES
Attempted to obtain vital signs at this time, pt. Refused. Pt. Verbalized refusal, pt. Able to reposition independently in bed.

## 2020-04-04 NOTE — ED PROVIDER NOTES
ED Provider Note Addendum    Scribed for Omari Kevin M.D. by Jn Alexis on 4/4/2020 at 6:24 AM.     This is an addendum to the note on Tomy Desai.  For further details and full chart information, see patient's initial note.       6:18 AM - I discussed the patient's case with Dr. Shi ([facility]) who will transfer care of the patient to me at this time.        6:24 AM  - Patient evaluated by myself.  Patient is resting comfortably at this time with no complaints. Vital signs stable, no acute distress, alert, will continue to monitor.    Disposition:  Patient will be transferred to an appropriate psychiatric facility in stable condition for further psychiatric care and evaluation.  Patient will be placed under the care of my partner awaiting transfer.     Jn GARCIA (Scribe), am scribing for, and in the presence of, Omari Kevin M.D..    Electronically signed by: Jn Alexis (Scribe), 4/4/2020    Omari GARCIA M.D. personally performed the services described in this documentation, as scribed by Jn Alexis in my presence, and it is both accurate and complete.    The note accurately reflects work and decisions made by me.  Omari Kevin M.D.  4/16/2020  5:33 PM

## 2020-04-04 NOTE — DISCHARGE PLANNING
MSW spoke to Geri at [facility]. They are unable to verify pt's Medicare days at this time. Will update MSW if they can accept pt.

## 2020-04-04 NOTE — ED NOTES
Pt medicated and sleeping at this time. Unable to perform [place] reassessment questions at this time will reassess

## 2020-04-04 NOTE — ED NOTES
"Pt cursing at staff, thinks he's in [facility], \"This is not a hospital.\"    "

## 2020-04-04 NOTE — ED NOTES
Pharmacy Medication Reconciliation        Unable to complete Medication Reconciliation at this time. Pt is not able to participate in interview.   No pharmacy on file and no family at bedside.

## 2020-04-04 NOTE — CONSULTS
"[facility]   TRIAGE ASSESSMENT    Name: Tomy Desai  MRN: [id number]  : 1965  Age: 54 y.o.  Date of assessment: 4/3/2020  PCP: Pcp Pt States None  Persons in attendance: Patient    CHIEF COMPLAINT/PRESENTING ISSUE (as stated by patient): 54 year old male BIB EMS and PD today with self-inflicted lacerations to right knuckles requiring sutures; per [facility] ED RN note, \"pt has homicidal ideation and was acting aggressively toward PD and EMS. Received 300 IM ketamine and 2 IV Versed PTA. Pt arrived in 4 point restraints\"; pt verbally aggressive towards [facility] ED staff, spitting; received Ativan 2 mg IV today at 1758; with noted h/o [facility] ED visit 17 after arrest, [facility] \"brought from [facility] for HI. Had been threatening to shoot and kill everyone\" and noted 16 visit, pt \" yelling and threatening security\"; currently, pt with delusional rambling, states he is at the ED because \"of a home invasion\" and \"you can't steal something I don't have:; paranoid; repeating \"cannot disclose\" to many evaluation questions asked; when asked about outpt  providers, pt states \"I don't believe in medical\"; noted psych diagnosis includes Psychotic disorder unspecified; previous inpt  tx at [facility] 2017, 2016; pt denies substance use; states he has a residence but will not answer further      Chief Complaint   Patient presents with   • Legal 2000   • Delusional        CURRENT LIVING SITUATION/SOCIAL SUPPORT: states he has a residence but will not answer further    BEHAVIORAL HEALTH TREATMENT HISTORY  Does patient/parent report a history of prior behavioral health treatment for patient?   Yes:    Dates Level of Care Facilty/Provider Diagnosis/Problem Medications   [facility], Haldol, Ativan, Benadryl         SAFETY ASSESSMENT - SELF  Does patient acknowledge current or past symptoms of dangerousness to self? Yes-self-inflicted lacerations to right knuckles requiring " "sutures today  Does parent/significant other report patient has current or past symptoms of dangerousness to self? N\A  Does presenting problem suggest symptoms of dangerousness to self? Yes:     Past Current    Suicidal Thoughts: []  []    Suicidal Plans: []  []    Suicidal Intent: []  []    Suicide Attempts: []  []    Self-Injury []  [x]      For any boxes checked above, provide detail: self-inflicted lacerations to right knuckles requiring sutures;    History of suicide by family member: Unknown, pt refusing to answer  History of suicide by friend/significant other: Unknown, pt refusing to answer  Recent change in frequency/specificity/intensity of suicidal thoughts or self-harm behavior? Unknown, pt refusing to answer  Current access to firearms, medications, or other identified means of suicide/self-harm? Unknown, pt refusing to answer  If yes, willing to restrict access to means of suicide/self-harm? Unknown, pt refusing to answer  Protective factors present:  Unknown, pt refusing to answer    SAFETY ASSESSMENT - OTHERS  Does patient acknowledge current or past symptoms of aggressive behavior or risk to others? Yes-today \"pt has homicidal ideation and was acting aggressively toward PD and EMS. Received 300 IM ketamine and 2 IV Versed PTA. Pt arrived in 4 point restraints\"; pt verbally aggressive towards [facility] ED staff, spitting; received Ativan 2 mg IV today at 1758; with noted h/o [facility] ED visit 11/23/17 after arrest, detention \"brought from detention for HI. Had been threatening to shoot and kill everyone\" and noted 5/31/16 visit, pt \" yelling and threatening security  Does parent/significant other report patient has current or past symptoms of aggressive behavior or risk to others?  N\A  Does presenting problem suggest symptoms of dangerousness to others? Yes:    History Current   Thoughts of injuring others? [x]  [x]    Threats to injure others? [x]  [x]    Plan to injure others? []  []    Intent to injure others? [x]  " "[x]    Has injured others? []  []    Thoughts of killing others? [x]  [x]    Threats to kill others? [x]  [x]    Plans to kill others? []  []    Intent to kill others? []  []    Has killed others? []  []    Perpetrator of sexual assault? []  []    Family history of homicide? []  []      For any boxes checked above, please provide detail: today \"pt has homicidal ideation and was acting aggressively toward PD and EMS. Received 300 IM ketamine and 2 IV Versed PTA. Pt arrived in 4 point restraints\"; pt verbally aggressive towards [facility] ED staff, spitting; received Ativan 2 mg IV today at 1758; with noted h/o [facility] ED visit 11/23/17 after arrest, [facility] \"brought from [facility] for HI. Had been threatening to shoot and kill everyone\" and noted 5/31/16 visit, pt \" yelling and threatening security    Recent change in frequency/specificity/intensity of thoughts or threats to harm others? no  Current access to firearms/other identified means of harm?  Unknown, pt refusing to answer  If yes, willing to restrict access to weapons/means of harm?  Unknown, pt refusing to answer  Protective factors present:  Unknown, pt refusing to answer  Based on information provided during the current assessment, is a mandated “duty to warn” being exercised? No    Crisis Safety Plan completed and copy given to patient? no    ABUSE/NEGLECT SCREENING  Does patient report feeling “unsafe” in his/her home, or afraid of anyone?  Unknown, pt refusing to answer  Does patient report any history of physical, sexual, or emotional abuse?  Unknown, pt refusing to answer  Does parent or significant other report any of the above? N\A  Is there evidence of neglect by self?  yes  Is there evidence of neglect by a caregiver? no  Does the patient/parent report any history of CPS/APS/police involvement related to suspected abuse/neglect or domestic violence? no  Based on the information provided during the current assessment, is a mandated report of suspected abuse/neglect " being made?  No    SUBSTANCE USE SCREENING  Pt denies substance use    ETOH negative  UDS pending collection      MENTAL STATUS   Participation: Limited verbal participation, Guarded, Defensive and Resistant  Grooming: Casual and Disheveled  Orientation: Alert, Evidence of delusions present and Evidence of hallucinations present  Behavior: Agitated and Aggressive  Eye contact: Limited  Mood: Anxious, Angry and Irritable  Affect: Constricted, Blunted, Anxious and Angry  Thought process: Loose associations and Perseveration  Thought content: Preoccupation, Rumination, Obsessions, Evidence of delusion and Paranoia  Speech: Loud and Pressured  Perception: Evidence of hallucination and Derealization  Memory:  unable to assess  Insight: Poor  Judgment:  Poor  Other:    Collateral information:   Source:  [] Significant other present in person:   [] Significant other by telephone  [] [facility]   [x] [facility]  [x] [facility]  [] Other:     [] Unable to complete full assessment due to:  [] Acute intoxication  [] Patient declined to participate/engage  [] Patient verbally unresponsive  [] Significant cognitive deficits  [] Significant perceptual distortions or behavioral disorganization  [] Other:      CLINICAL IMPRESSIONS:  Primary:  Psychosis by history  Secondary:         IDENTIFIED NEEDS/PLAN:  [Trigger DISPOSITION list for any items marked]    [x]  Imminent safety risk - self [x] Imminent safety risk - others   []  Acute substance withdrawal [x]  Psychosis/Impaired reality testing   []  Mood/anxiety []  Substance use/Addictive behavior   [x]  Maladaptive behaviro []  Parent/child conflict   []  Family/Couples conflict []  Biomedical   []  Housing []  Financial   []   Legal  Occupational/Educational   []  Domestic violence []  Other:     Disposition: Actively being addressed by Legal Hold and [facility]; Medicaid insurance plan; [facility]    Does patient express agreement with the above plan? Unknown, pt refusing to answer      Referral appointment(s) scheduled? no    Alert team only:   I have discussed findings and recommendations with Dr. Layne who is in agreement with these recommendations. Legal hold completed    Referral information sent to the following community providers : NA    If applicable : Referred  to : Kelsie 4/3/2020 for legal hold follow up at (time):  1550      Nu Durbin R.N.  4/3/2020

## 2020-04-04 NOTE — ED NOTES
Pt given Haldol 5 mg IM, Benadryl 50 mg IM as pt has pulled out LH IV site. Warm blankets and box lunch given.

## 2020-04-04 NOTE — ED NOTES
Pt refused me taking vitals. I verbally expressed how quick it would be and that he could go right back to sleep. Pt became verbally aggressive and physically protective. Repeatedly said he wouldn't allow me to take vitals and that we are unable to do so anyway, as we don't have the means to do so. Left Pt alone and informed RN.

## 2020-04-04 NOTE — ED NOTES
Pt standing in corner with cup at waist level when asked if he was peeing pt states he was drinking water and became verbally aggressive with staff. Pt was verbally redirected by RN and security and laid back down in bed

## 2020-04-04 NOTE — PSYCHIATRY
BRIEF PSYCHIATRIC CONSULT NOTE: patient seen, full note to follow.  -Legal hold extended at this time due to risk of harm to self and others.  -Started risperidone 2 mg p.o. twice daily for psychosis and mood stabilization.  -Restrict personal effects and phone.  -Continue one-to-one observation for patient safety.  -Patient should be transferred to an inpatient psychiatric facility when a bed is available.  -We will continue to follow.  Thank you for the consult.

## 2020-04-04 NOTE — ED NOTES
Pt. Provided with lunch box and milk, pt. Denies further needs, sitter within visual range of patient. Safety precautions maintained in room, will continue to monitor.

## 2020-04-04 NOTE — DISCHARGE PLANNING
Medical Social Work    Referral: Legal Hold    Intervention: Legal Hold Paperwork given to SW by [employer] RN Nu    Legal Hold Initiated: Date: 4/3/2020 Time: 1515    Patient’s Insurance Listed on Face Sheet: Medicare    Referrals sent to: [facility], [place], [facility], [facility], and [facility].     This referral contains the following information:  1) Face sheet __x__  2) Page 1 and Page 2 of Legal Hold _x___  3) Alert Team Assessment/Psych Assessment __x__  4) Head to toe physical exam _x___  5) Urine Drug Screen __x__  6) Blood Alcohol _x___  7) Vital signs __x__  8) Pregnancy test when applicable __na_  9) Medications list __x__    Plan: Patient will transfer to mental health facility once acceptance is obtained

## 2020-04-04 NOTE — ED NOTES
Attempted to medicate patient with risperidone, Alert RN made aware, encouraged patient to take med. Pt. Still refused medication. Pt. Is responsive to verbal stimuli, speaking is full sentences, pt. Refusing all treatment currently including vital signs and hygiene activities.

## 2020-04-04 NOTE — DISCHARGE PLANNING
ALERT team  note:  54 year old male admitted 4/3/2020, legal hold, HI, inability to care for self; Medicare insurance plan; pt evaluated by [facility] ED psychiatry team, legal hold cont/extended; pt with decreased insight and judgment r/t reason for hospitalization; refusing to accept feedback from staff; paranoid; refusing Risperdal 2 mg PO this AM; alteration in thoughts r/t psychosis, cont; [facility]

## 2020-04-04 NOTE — PSYCHIATRY
"PSYCHIATRIC INTAKE EVALUATION    *Reason for admission: Property destruction and agitation                   *Reason for consult:      Psychosis  *Requesting Physician/APN:  Dr. Bejarano       Supervising Psychiatrist:     Dr. Rivera     Legal Hold status:    L2 K        *Chief Complaint:   \"I have no mental problems.\"    *HPI (includes Psychiatric ROS):      Patient is a 54-year-old male with a history of psychosis who was brought in by ambulance and police after property destruction, self-mutilation, and aggression towards police.  Patient is a poor historian, minimally engaging, irritable, and ended the interview and refused to speak further.  He was alert and oriented to person.  Thought he was in [place].  Was not able to recall the events leading to his admission.  Per chart review patient had destroyed his apartment, broke a mirror in the bathroom and used a glass to cut his knuckles.  He was later sutured in the ED yesterday night.  However patient reports that the police \"decided to tear up the place.\"  Patient was brought in by ambulance and police due to his agitation.  He was aggressive and threatening police, requiring four-point restraints in the ED, Haldol, and Ativan last night.  He would not answer questions about suicidal and homicidal ideation.  Patient does not remember cutting his knuckles.  When asked about visual hallucinations, patient reported seeing \"just your knuckle head.\"  Patient then repeatedly reported \"I have no comment at this time.\"  Per chart review patient has been admitted in 2016 and 2017 for psychosis, agitation, and homicidal ideation.  He has a history of refusing medications in the ED.  He did report  that he had not slept for 3 days prior to admission.  In 2016 per chart review he reported that he had been \"walking for 3 days\" prior to admission.    *Medical Review Of Symptoms (not dx conditions):   Review of Systems   Unable to perform ROS: Psychiatric disorder " "    All other systems reviewed and are negative.       *Psychiatric Examination:   Vitals:    04/04/20 0627   BP:    Pulse: 81   Resp: 16   SpO2: 91%      General Appearance: Patient appears younger than his stated age, fairly well-kempt, fair hygiene with multiple stitches across the knuckles of his right hand, lying in hospital bed.  Abnormal Movements: No tremor or dyskinesia noted.  Gait and Posture: Unable to assess.  Speech: Normal rate and tone.  Normal volume.  Thought Process: Linear and goal-directed.  Associations: No loosening associations.  Abnormal or Psychotic Thoughts: Patient does not appear to be responding to internal stimuli.  When asked about visual hallucinations patient reports \"just your Knuckle head.\"  Judgement and Insight: Poor, poor.  Orientation: Patient is alert and oriented to person only.  He said that he is currently in [place] and is not oriented to time or event  Recent and Remote Memory: Grossly impaired, patient does not remember the events leading to his admission.  Attention Span and Concentration: Grossly intact.  Language: Fluent English.  Fund of Knowledge: Adequate.  Mood and Affect: \"Just fine.\"  Dysthymic and irritable.  Constricted.  SI/HI: Would not answer questions about suicide and homicide, reports \"I have no comment at this time.\"  MMSE score and/or clock drawing:    *PAST MEDICAL/PSYCH/FAMILY/SOCIAL(as reported by patient):       *medical hx:        TBI: Unable to assess.  SZ: Unable to assess.  Stroke: Unable to assess.  No past medical history on file.  No past surgical history on file.     *psychiatric hx:   SAs: Patient with answer questions about suicide attempts.  Guns: Patient would not answer.  Hx of Violence: Patient apparently destroyed his apartment prior to admission.  Would not answer questions by history of violence.  Patient also has a history of incarceration in 2017 per chart review.  Hospitalizations: Could not assess.  Med Hx: Patient has " been treated with Haldol in the past for agitation.  Dx Hx: Patient has a history of psychosis per chart review.  Other:     *family Psych hx: Patient would not answer questions about family history.     *social hx: Patient would not answer questions about social history.  Alcohol: Patient would not answer questions about alcohol use.  BAL was 0 on admission.  Drugs: Would not answer questions about drug use.  History of UDS positive for THC in the past.     *MEDICAL HX: labs, MARS, medications, etc were reviewed. Only those findings of potential interest to psychiatry are noted below:    *Current Medical issues:        *Allergies:  No Known Allergies  *Current Medications:    Current Facility-Administered Medications:   •  risperiDONE (RISPERDAL) tablet 2 mg, 2 mg, Oral, BID, Doe Arzola M.D.  •  diphenhydrAMINE (BENADRYL) tablet/capsule 50 mg, 50 mg, Oral, Q HOUR PRN **OR** diphenhydrAMINE (BENADRYL) injection 50 mg, 50 mg, Intramuscular, Q30 MIN PRN, Doe Arzola M.D.  •  haloperidol (HALDOL) tablet 5 mg, 5 mg, Oral, Q HOUR PRN **OR** haloperidol lactate (HALDOL) injection 5 mg, 5 mg, Intramuscular, Q30 MIN PRN, Doe Arzola M.D.  •  LORazepam (ATIVAN) tablet 2 mg, 2 mg, Oral, Q HOUR PRN **OR** LORazepam (ATIVAN) injection 2 mg, 2 mg, Intramuscular, Q30 MIN PRN, Doe Arzola M.D.  No current outpatient medications on file.  *EKG: None.  *Imaging: None.   EEG: None.     *Labs:  No results for input(s): WBC, RBC, HEMOGLOBIN, HEMATOCRIT, MCV, MCH, RDW, PLATELETCT, MPV, NEUTSPOLYS, LYMPHOCYTES, MONOCYTES, EOSINOPHILS, BASOPHILS, RBCMORPHOLO in the last 72 hours.  No results found for: SODIUM, POTASSIUM, CHLORIDE, CO2, GLUCOSE, BUN, CREATININE, BUNCREATRAT, GLOMRATE      Lab Results   Component Value Date/Time    BREATHALIZER 0.001 11/22/2017 1853     No components found for: BLOODALCOHOL   Lab Results   Component Value Date/Time    AMPHUR Negative 11/23/2017 0343    BARBSURINE Negative  11/23/2017 0343    BENZODIAZU Negative 11/23/2017 0343    COCAINEMET Negative 11/23/2017 0343    METHADONE Negative 11/23/2017 0343    OPIATES Negative 11/23/2017 0343    OXYCODN Negative 11/23/2017 0343    PCPURINE Negative 11/23/2017 0343    PROPOXY Negative 11/23/2017 0343    CANNABINOID Positive (A) 11/23/2017 0343     No results found for: TSH, FREET4      *ASSESSMENT:  Patient is a 54-year-old male with a history of psychosis who was brought in by ambulance and police after property destruction, self-mutilation, and aggression towards police.  Patient is alert and oriented x1, disorganized, and acutely psychotic.  He represents significant risk of harm to self and to others and should remain on a legal hold at this time.  He should be transferred to a psychiatric facility when a bed is available.  He may benefit well from risperidone 2 mg twice daily along with PRN's for agitation.    Dx:  Unspecified psychosis  Cannabis use disorder    PLAN:  -Legal hold extended at this time due to risk of harm to self and others.  -Started risperidone 2 mg p.o. twice daily for psychosis and mood stabilization.  -PRN's for agitation place.  -Restrict personal effects and phone.  -Continue one-to-one observation for patient safety.  -Patient should be transferred to an inpatient psychiatric facility when a bed is available.  -We will continue to follow.  Thank you for the consult.

## 2020-04-05 VITALS
WEIGHT: 156 LBS | TEMPERATURE: 98.3 F | SYSTOLIC BLOOD PRESSURE: 118 MMHG | DIASTOLIC BLOOD PRESSURE: 77 MMHG | HEIGHT: 67 IN | BODY MASS INDEX: 24.48 KG/M2 | OXYGEN SATURATION: 96 % | HEART RATE: 69 BPM | RESPIRATION RATE: 16 BRPM

## 2020-04-05 PROCEDURE — A9270 NON-COVERED ITEM OR SERVICE: HCPCS | Performed by: STUDENT IN AN ORGANIZED HEALTH CARE EDUCATION/TRAINING PROGRAM

## 2020-04-05 PROCEDURE — 99282 EMERGENCY DEPT VISIT SF MDM: CPT | Performed by: PSYCHIATRY & NEUROLOGY

## 2020-04-05 PROCEDURE — 700102 HCHG RX REV CODE 250 W/ 637 OVERRIDE(OP): Performed by: STUDENT IN AN ORGANIZED HEALTH CARE EDUCATION/TRAINING PROGRAM

## 2020-04-05 RX ADMIN — RISPERIDONE 2 MG: 2 TABLET ORAL at 18:06

## 2020-04-05 NOTE — ED NOTES
Patient observed resting in gurney, presumably sleeping, no s/s of discomfort or distress at this time. Unlabored breathing & visible chest rise noted. Patient repositions self occasionally. Bed in lowest position, room & floor clear. Sitter in doorway performing direct observation. Pt in line of sight from RN station.

## 2020-04-05 NOTE — ED NOTES
Patient is resting comfortably. PT refused all medications and vital signs, PT states feel fine and does not want to be touched by staff. PT calm in bed sitter in place.

## 2020-04-05 NOTE — ED NOTES
Patient's home medications have been reviewed by the pharmacy team.     No past medical history on file.    Patient's Medications    No medications on file          A:  Unable to obtain accurate list of patient's home medications due to patient behavior. Unable to assess potential pharmacotherapy concerns that may be contributing to current complaints. He is refusing scheduled risperidone initiated by psychiatry provider, as well as other patient cares (vitals, etc).     P:    No recommendations at this time, unable to confirm home medications for possible restart. Psychiatry has consulted on patient - defer further mental health medication changes to their team    Suad Pruett, YahirD

## 2020-04-05 NOTE — ED NOTES
Assumed care of pt. Pt resting in bed repositioning self, no signs of distress, denies si/hi. Sitter in place for elopement.

## 2020-04-05 NOTE — PSYCHIATRY
"PSYCHIATRIC FOLLOW-UP:(established)  *Reason for admission:  complaint of suicidal behavior, combative behavior.  The patient was found in the bathroom, apt destroyed, broke glass  (hand requiring sutures)and try to cut himself instead he went to kill himself.  EMS was called, the patient received ketamine and Versed in route secondary to his agitation.  Here in the emergency department, he is screaming at us, he is in four-point restraints he is stating that he wants to kill himself.       *Legal Hold Status on Admission:   +                  *HPI:  Sitting up in the bed with his head covered. Won't participate in interview: made a few sarcastic comments that were uninformative.         Notes:  up to restroom, pt slammed door closed. Door opened by security. Pt then began cussing at staff and refused to provide urine sample. Pt states \"I am not going to shit with the door open\" ......... urinating in cup in room, pt told to use restroom and cup taken away after pt dumped it. Pt refusing to give a urine sample .......... asked to remove sheet from head and neck, told this nurse to \"fuck off\" after a short verbal education on acceptable behavior and rules pt removed sheet from head.         Did eat.    *Psychiatric Examination:  Vitals:Blood pressure 118/77, pulse 69, temperature 36.8 °C (98.3 °F), temperature source Temporal, resp. rate 16, height 1.702 m (5' 7\"), weight 70.8 kg (156 lb), SpO2 96 %.  General Appearance: can't assess. He has his head covered  Abnormal Movements: none  Gait and Posture: as noted  Speech: angry sounding and limited  Thought processes: fast rate  Associations: unable to assess  Abnormal or Psychotic Thoughts: none overtly  Judgement and Insight: poor  Orientation: unable to assess  Recent and Remote Memory: unable to assess  Attention Span and Concentration: intact  Language: fluid  Fund of Knowledge:unable to assess  Mood and Affect: irritable  SI/HI:unable to assess      " *ASSESSMENT/PLAN:  1. Psychotic disorder unspc  - risperdol 2 mg bid: refusing it  -UDS negative       -R/O mood disorder with psychosis     2. Medical:  - sutures                 Legal hold: extended      *Legal hold: extended                *Will Follow

## 2020-04-05 NOTE — ED NOTES
"Pt up to restroom, pt slammed door closed. Door opened by security. Pt then began cussing at staff and refused to provide urine sample. Pt states \"I am not going to shit with the door open\"   "

## 2020-04-05 NOTE — ED NOTES
Pt urinating in cup in room, pt told to use restroom and cup taken away after pt dumped it. Pt refusing to give a urine sample

## 2020-04-05 NOTE — DISCHARGE PLANNING
MSW spoke to Katia at [facility]. Because pt is refusing vitals and medications they need wait before they are able to accept pt. Will re-staff when pt becomes more cooperative. Bedside RN updated.

## 2020-04-05 NOTE — ED NOTES
"Pt asked to remove sheet from head and neck, told this nurse to \"fuck off\" after a short verbal education on acceptable behavior and rules pt removed sheet from head.   "

## 2020-04-05 NOTE — DISCHARGE PLANNING
SW received phone call from Katia from [facility].  Patient has been accepted for transfer.  Accepting MD Connor with a requested transfer time of 2030.  MARILYN called [facility] to requested tranportation and was informed by Alexandr that Pt does not have transportation benefits with his medicaid. MARILYN completed [facility] PCS form and faxed required documentation to [facility].      MARILYN received follow up call from Katia from [facility] informing SW that they are unable to accept Pt until he is cooperative with vitals.    MARILYN called [facility] and placed Pt transport on will-call.

## 2020-04-05 NOTE — ED PROVIDER NOTES
ED Provider Note Addendum    Scribed for Tay Salcedo M.D. by Markie Nuñez on 4/5/2020 at 6:00 AM     This is an addendum to the note on Tomy Desai.  For further details and full chart information, see patient's initial note.       6:00 AM - I discussed the patient's case with Dr. Shi ([facility]) who will transfer care of the patient to me at this time.        12:43 PM   - Patient evaluated by myself.  Patient is resting comfortably at this time with no complaints.    Disposition:  Patient will be transferred to an appropriate psychiatric facility in stable condition for further psychiatric care and evaluation.  Patient will be placed under the care of my partner awaiting transfer.     I, Markie Nuñez (Scribe), am scribing for, and in the presence of, Tay Salcedo M.D..    Electronically signed by: Markie Nuñez (Herberthibe), 4/5/2020    Tay GARCIA M.D. personally performed the services described in this documentation, as scribed by Markie Nuñez in my presence, and it is both accurate and complete.    The note accurately reflects work and decisions made by me.  Tay Salcedo M.D.  4/5/2020  12:43 PM

## 2020-04-06 NOTE — ED NOTES
"Pt standing in corner, all cups removed from room. Pt requested this RN to \"give me a blow job\" this RN informed the pt that this would not happen and is not appropriate language. Pt stated that this RN sounds like a \"homosexual\". Pt was unable to provide further information on that statement and stated he had no other needs at this time.   "

## 2020-04-06 NOTE — DISCHARGE PLANNING
Medical Social Work    Referral: Legal hold Transfer to Mental Health Facility    Intervention: MARILYN received call from Ej at [facility] stating that Dr. Connor has accepted the patient for admission.     MARILYN arranged for transportation to be set up through [place] with [employer].    Per notes, MARILYN Vang contacted Alexandr at [facility] on 04/04/2020 and pt has no transportation benefits.     The pt will be picked up at 2330.     MARILYN notified the RN of the departure time as well as accepting facility.     MARILYN created transfer packet and placed on chart. Original Legal Hold placed in packet.     Plan: Pt will transfer to [facility] at 2330.

## 2020-04-06 NOTE — ED NOTES
"Pt states he wants to go to the bathroom. This RN walked with pt to bathroom. Told pt he could not close the bathroom door all the way. Pt became angry. States \"how the fuck am I supposed to use the bathroom without some privacy?\". Told pt this is hospital policy. Pt states \"what hospital? Im not at the hospital. They threw me out with a broken ankle\". Pt states no longer wants to use the bathroom. Pt walked back to room  "

## 2020-04-06 NOTE — ED NOTES
[facility] transferred to [facility]. Ambulated out.

## 2020-04-06 NOTE — ED NOTES
Pt refused medication and then verbally threatened staff. Pt now back in back after security showed up.

## 2020-04-17 ENCOUNTER — HOSPITAL ENCOUNTER (EMERGENCY)
Facility: MEDICAL CENTER | Age: 55
End: 2020-04-17
Attending: EMERGENCY MEDICINE
Payer: MEDICARE

## 2020-04-17 VITALS
SYSTOLIC BLOOD PRESSURE: 123 MMHG | HEART RATE: 102 BPM | WEIGHT: 135 LBS | RESPIRATION RATE: 20 BRPM | OXYGEN SATURATION: 97 % | TEMPERATURE: 98.8 F | DIASTOLIC BLOOD PRESSURE: 91 MMHG | BODY MASS INDEX: 19.33 KG/M2 | HEIGHT: 70 IN

## 2020-04-17 VITALS — BODY MASS INDEX: 22.33 KG/M2 | WEIGHT: 156 LBS | HEIGHT: 70 IN

## 2020-04-17 DIAGNOSIS — R45.1 AGITATION: ICD-10-CM

## 2020-04-17 DIAGNOSIS — Z76.5 MALINGERING: ICD-10-CM

## 2020-04-17 PROCEDURE — 99283 EMERGENCY DEPT VISIT LOW MDM: CPT

## 2020-04-17 SDOH — HEALTH STABILITY: MENTAL HEALTH: HOW OFTEN DO YOU HAVE 6 OR MORE DRINKS ON ONE OCCASION?: WEEKLY

## 2020-04-17 NOTE — ED PROVIDER NOTES
"ED Provider Note    CHIEF COMPLAINT  Chief Complaint   Patient presents with   • Psych Eval     sent from [facility] via ems on hold, per ems pt is having hallucinations.  pt refusing to answer most questions upon arrival.         HPI  Tomy Desai is a 54 y.o. male who presents to the emergency department after being sent from [facility].  He arrives on a legal hold but states that the patient reported he was suicidal.  When I asked the patient why he is here he says that he will not tell me this.  He says the information is classified and asked, \"get me something to eat promptly.\"  When asked about the legal hold he says that he just told them that so that I would have a place to go.  He denies medical complaints.  No fevers chills cough.  When he is told that he should only be in the emergency department if he has any emergency he requests a ride to another city.  He says that he will leave until someone gets him a ride.    REVIEW OF SYSTEMS  As per HPI    PAST MEDICAL HISTORY  Schizophrenia    SOCIAL HISTORY  Social History     Tobacco Use   • Smoking status: Current Every Day Smoker   • Smokeless tobacco: Never Used   Substance Use Topics   • Alcohol use: Yes     Binge frequency: Weekly   • Drug use: Yes     Types: Inhaled     Comment: thc       SURGICAL HISTORY  History reviewed. No pertinent surgical history.    CURRENT MEDICATIONS  Home Medications     Reviewed by Paola Anders R.N. (Registered Nurse) on 04/17/20 at 0628  Med List Status: <None>   Medication Last Dose Status        Patient Ilia Taking any Medications                       ALLERGIES  No Known Allergies    PHYSICAL EXAM  VITAL SIGNS: Ht 1.778 m (5' 10\")   Wt 70.8 kg (156 lb)   BMI 22.38 kg/m²    Constitutional: Awake and alert  HENT: Inspection  Eyes: Normal inspection  Neck: Supple  Cardiovascular: Normal heart rate, Normal rhythm.  Symmetric peripheral pulses.   Thorax & Lungs: No respiratory distress, No wheezing, No " rales, No rhonchi, No chest tenderness.   Abdomen: Bowel sounds normal, soft, non-distended, nontender, no mass  Skin: Warm, Dry, No rash.   Back: No tenderness, No CVA tenderness.   Extremities: Well perfused  Neurologic: ANO.  Steady gait.  Clear speech.  Psychiatric: He is awake and alert oriented to person place..  Clear speech.  He denies hallucinations.  Denies suicidality.  Becomes agitated when discussing that he does not need to be in the emergency room      COURSE & MEDICAL DECISION MAKING  Patient presents to the ER on a legal hold.  His vital signs are stable.  He has no medical complaints.  He says that he is not actually suicidal.  He tells me that he said this just we had a place to go.  His chart was reviewed noting a previous admission to the ER and transfer to psychiatric facility after suicidal ideation.  At this time however there is no indication to continue the legal hold given the scenario.  When he was told that he would be discharged from the ER he said he refused to go.  Will be escorted off property.    FINAL IMPRESSION  1.  Malingering  2.  Schizophrenia      This dictation was created using voice recognition software. The accuracy of the dictation is limited to the abilities of the software.  The nursing notes were reviewed and certain aspects of this information were incorporated into this note.      Electronically signed by: Adam Cedeño M.D., 4/17/2020 6:36 AM

## 2020-04-17 NOTE — ED TRIAGE NOTES
"Chief Complaint   Patient presents with   • Psych Eval     sent from [facility] via ems on hold, per ems pt is having hallucinations.  pt refusing to answer most questions upon arrival.        Pt bib ems from [facility] after pt walked there this morning.  Per ems, pt reported SI to staff and was sent to [facility] for L[facility] and admission to [facility]. Upon arrival, pt asked about SI and reports \"I told those fuckers at [facility] I did so I could get admitted and sleep there.\" [facility] staff called pd for assistance after pt became violent and aggressive.  Security called to bedside.  Pt refuses to answer most triage questions and states the information \"is confidential.\"      Pt to be discharged by ERP.    "

## 2020-04-18 NOTE — ED TRIAGE NOTES
Patient brought in by EMS for Psych. Evaluation. EMS reports that patient was @ [facility], was told by facility to get medically cleared for admission, sent to [facility] but pt left AMA. Patient reports having auditory and visual hallucinations but findings are not new for patient, per EMS. Patient denies any SI/HI upon arrival to ED.

## 2020-04-18 NOTE — ED PROVIDER NOTES
ED Provider Note    ED Provider Note      Primary care provider: Pcp Pt States None    I verified that the patient was wearing a mask and I was wearing appropriate PPE every time I entered the room. The patient's mask was on the patient at all times during my encounter except for a brief view of the oropharynx.      CHIEF COMPLAINT  Chief Complaint   Patient presents with   • Psych Eval     Psych Eval/Needs Medical Clearance to be Admitted        HPI  Tomy Desai is a 54 y.o. male who presents to the Emergency Department with chief complaint of psych eval.  Patient reports that he went to [facility] today for admission but states that they sent him here to get medically cleared.  Patient reports that he was looking to be admitted there because he had lost access to his hotel room.  Patient was also evaluated at our main campus earlier today and discharged from that facility.  Upon arrival here he denies suicidal ideation he denies homicidal ideation.  He states that he feels as though he is having psychiatric issues because he was both denied admission to [facility] as well as losing his hotel room that he had.  Patient reports that he is also been walking all day and that he his feet are sore from that.  He has had no headache no fevers no cough no shortness of breath no abdominal pain no other acute symptoms or concerns.    REVIEW OF SYSTEMS  10 systems reviewed and otherwise negative, pertinent positives and negatives listed in the history of present illness.    PAST MEDICAL HISTORY   Psychiatric illness, malingering    SURGICAL HISTORY  patient denies any surgical history    SOCIAL HISTORY  Social History     Tobacco Use   • Smoking status: Current Every Day Smoker   • Smokeless tobacco: Never Used   Substance Use Topics   • Alcohol use: Yes     Binge frequency: Weekly   • Drug use: Yes     Types: Inhaled     Comment: thc      Social History     Substance and Sexual Activity  "  Drug Use Yes   • Types: Inhaled    Comment: thc       FAMILY HISTORY  Non-Contributory    CURRENT MEDICATIONS  Home Medications    **Home medications have not yet been reviewed for this encounter**         ALLERGIES  No Known Allergies    PHYSICAL EXAM  VITAL SIGNS: /91   Pulse (!) 102   Temp 37.1 °C (98.8 °F) (Temporal)   Resp 20   Ht 1.778 m (5' 10\")   Wt 61.2 kg (135 lb)   SpO2 97%   BMI 19.37 kg/m²   Pulse ox interpretation: I interpret this pulse ox as normal.  Constitutional: Alert and oriented x 3, no acute distress  HEENT: Atraumatic normocephalic, pupils are equal round reactive to light extraocular movements are intact. The nares is clear, external ears are normal, mouth shows moist mucous membranes  Neck: Supple, no JVD no tracheal deviation  Cardiovascular: Borderline tachycardic no murmur rub or gallop 2+ pulses peripherally x4  Thorax & Lungs: No respiratory distress, no wheezes rales or rhonchi, No chest tenderness.   GI: Soft nontender nondistended positive bowel sounds, no peritoneal signs  Skin: Warm dry no acute rash or lesion  Musculoskeletal: Moving all extremities with full range and 5 of 5 strength, no acute  Deformity  Neurologic: Cranial nerves III through XII are grossly intact, no sensory deficit, no cerebellar dysfunction   Psychiatric: Agitated      DIAGNOSTIC STUDIES / PROCEDURES    COURSE & MEDICAL DECISION MAKING  Pertinent Labs & Imaging studies reviewed. (See chart for details)    12:08 AM - Patient seen and examined at bedside.         Patient noted to have slightly elevated blood pressure likely circumstantial secondary to presenting complaint. Referred to primary care physician for further evaluation.      Medical Decision Making: Patient denying SI HI here.  Previous notes state that he is told several medical practitioners that he was simply is told people that he was suicidal recently to get placement/bedding.  Patient is agitated that I am not doing anything " "for him he wants me to keep him in the emergency department so that he can rest his feet.  At this point he obviously has no emergent condition he very clearly seems to be malingering for secondary gain.  Patient states that he was sent here from [facility] for clearance however I called their facility and discussed with him they state that he had not been in their facility for 3 days.  At this time patient's agitated that were not doing anything for him but I have explained at length that this is not appropriate use of the emergency department especially in light of her current pandemic situation.  Patient discharged from this hospital in stable condition.    /91   Pulse (!) 102   Temp 37.1 °C (98.8 °F) (Temporal)   Resp 20   Ht 1.778 m (5' 10\")   Wt 61.2 kg (135 lb)   SpO2 97%   BMI 19.37 kg/m²     [facility]  [address] [facility] [address] [phone]  Go to       [facility], Emergency Dept  [address]  [facility] [address] [phone]    If symptoms worsen      There are no discharge medications for this patient.      FINAL IMPRESSION  1. Agitation Active   2. Malingering Active          This dictation has been created using voice recognition software and/or scribes. The accuracy of the dictation is limited by the abilities of the software and the expertise of the scribes. I expect there may be some errors of grammar and possibly content. I made every attempt to manually correct the errors within my dictation. However, errors related to voice recognition software and/or scribes may still exist and should be interpreted within the appropriate context.            "

## 2020-04-18 NOTE — ED NOTES
"Patient refused to sign discharge paperwork. States that \"you guys did nothing for me. I'm not paying for this visit.\" Charge RN and ED provider made aware.   "

## 2020-05-11 ENCOUNTER — HOSPITAL ENCOUNTER (EMERGENCY)
Dept: HOSPITAL 8 - ED | Age: 55
LOS: 1 days | Discharge: PSYCHIATRIC OR SUBSTANCE USE FACILITY | End: 2020-05-12
Payer: MEDICARE

## 2020-05-11 VITALS — DIASTOLIC BLOOD PRESSURE: 100 MMHG | SYSTOLIC BLOOD PRESSURE: 136 MMHG

## 2020-05-11 VITALS — HEIGHT: 69 IN | WEIGHT: 154.32 LBS | BODY MASS INDEX: 22.86 KG/M2

## 2020-05-11 DIAGNOSIS — F22: Primary | ICD-10-CM

## 2020-05-11 DIAGNOSIS — I10: ICD-10-CM

## 2020-05-11 LAB
ALBUMIN SERPL-MCNC: 3.4 G/DL (ref 3.4–5)
ALP SERPL-CCNC: 58 U/L (ref 45–117)
ALT SERPL-CCNC: 21 U/L (ref 12–78)
ANION GAP SERPL CALC-SCNC: 7 MMOL/L (ref 5–15)
BASOPHILS # BLD AUTO: 0.02 X10^3/UL (ref 0–0.1)
BASOPHILS NFR BLD AUTO: 1 % (ref 0–1)
BILIRUB SERPL-MCNC: 0.6 MG/DL (ref 0.2–1)
CALCIUM SERPL-MCNC: 8.5 MG/DL (ref 8.5–10.1)
CHLORIDE SERPL-SCNC: 107 MMOL/L (ref 98–107)
CREAT SERPL-MCNC: 1.26 MG/DL (ref 0.7–1.3)
CULTURE INDICATED?: NO
EOSINOPHIL # BLD AUTO: 0.06 X10^3/UL (ref 0–0.4)
EOSINOPHIL NFR BLD AUTO: 1 % (ref 1–7)
ERYTHROCYTE [DISTWIDTH] IN BLOOD BY AUTOMATED COUNT: 13.7 % (ref 9.4–14.8)
LYMPHOCYTES # BLD AUTO: 1.01 X10^3/UL (ref 1–3.4)
LYMPHOCYTES NFR BLD AUTO: 20 % (ref 22–44)
MCH RBC QN AUTO: 31 PG (ref 27.5–34.5)
MCHC RBC AUTO-ENTMCNC: 33.4 G/DL (ref 33.2–36.2)
MCV RBC AUTO: 92.9 FL (ref 81–97)
MD: NO
MICROSCOPIC: (no result)
MONOCYTES # BLD AUTO: 0.41 X10^3/UL (ref 0.2–0.8)
MONOCYTES NFR BLD AUTO: 8 % (ref 2–9)
NEUTROPHILS # BLD AUTO: 3.53 X10^3/UL (ref 1.8–6.8)
NEUTROPHILS NFR BLD AUTO: 70 % (ref 42–75)
PLATELET # BLD AUTO: 262 X10^3/UL (ref 130–400)
PMV BLD AUTO: 8.2 FL (ref 7.4–10.4)
PROT SERPL-MCNC: 6.6 G/DL (ref 6.4–8.2)
RBC # BLD AUTO: 4.36 X10^6/UL (ref 4.38–5.82)
VANCOMYCIN TROUGH SERPL-MCNC: < 1.7 MG/DL (ref 2.8–20)

## 2020-05-11 PROCEDURE — 36415 COLL VENOUS BLD VENIPUNCTURE: CPT

## 2020-05-11 PROCEDURE — 80307 DRUG TEST PRSMV CHEM ANLYZR: CPT

## 2020-05-11 PROCEDURE — 85025 COMPLETE CBC W/AUTO DIFF WBC: CPT

## 2020-05-11 PROCEDURE — 99285 EMERGENCY DEPT VISIT HI MDM: CPT

## 2020-05-11 PROCEDURE — 81003 URINALYSIS AUTO W/O SCOPE: CPT

## 2020-05-11 PROCEDURE — 84443 ASSAY THYROID STIM HORMONE: CPT

## 2020-05-11 PROCEDURE — 80053 COMPREHEN METABOLIC PANEL: CPT

## 2020-05-11 NOTE — NUR
REPORT GIVEN TO JOJO FRAZIER. PT LAYING IN BED, RESPIRATIONS EVEN AND UNLABORED, 
IN VIEW OF SITTER.

## 2020-05-11 NOTE — NUR
BREAK RN: REPORT TO RAFI URIBE, PLAN OF CARE DISCUSSED.  SPIT GONSALVES OFF, ORDERED 
MEAL. PT AGREES TO BE COOPERATIVE

## 2020-05-11 NOTE — NUR
PT LAYING IN BED EDUCATED ABOUT NOT HARMING SELF OR OTHERS, PT AGREED, IS IN 
CALM STATE, RESTRAINTS TO BE REMOVED. DIET TRAY ORDERED. REMAINS IN VIEW OF 
SITTER.

## 2020-05-11 NOTE — NUR
PT SLEEPING, OCCASIONAL MOVEMENT NOTED. RESPIRATIONS EVEN AND UNLABORED. ROOM 
REMAINS SECURE, SITTER OUTSIDE DOOR MONITORING PT,.

## 2020-05-11 NOTE — NUR
REPORT RECEIVED FROM JOJO MCKEE. ASSUMED CARE OF PT. PT SLEEPING, RESPIRATIONS 
EVEN AND UNLABORED. SITTER OUTSIDE DOOR MONITORING PT. SITTER OUTSIDE DOOR. 
WILL CONTINUE TO MONITOR

## 2020-05-11 NOTE — NUR
BREAK RN:  THIS IS A 54 YEAR OLD MALE WHO BIB BY [facility] AND RELEASED AND WAS 
PLACED ON LEGAL HOLD, DUE TO "INABILITY TO CARE FOR SELF AS EVIDENCE....SMEARED 
FECES ACROSS HIS CELL WINDOW AND BODY, CREATED SWASTIKA SYMBOL ON BODY WITH 
FECES" PT VERY UNCOOPERATIVE, YELLING AND SPITTING.  PT PLACED IN 4 POINT 
RESTRAINTS AND SPIT GONSALVES.

## 2020-05-11 NOTE — NUR
Pi from [facility] states they will reassess patient at a 
different time and therefore we can fax out patients packet to all acceptable 
psych facilities.

## 2020-05-11 NOTE — NUR
PT LAYING IN BED, COVERS OVER FACE. ALL NEEDS MET AT THIS TIME. NO COMPLAINTS, 
NO SIGNS OF DISTRESS.

## 2020-05-12 ENCOUNTER — HOSPITAL ENCOUNTER (INPATIENT)
Dept: HOSPITAL 8 - 3E | Age: 55
Discharge: LEFT BEFORE DISCHARGE | DRG: 885 | End: 2020-05-12
Attending: PSYCHIATRY & NEUROLOGY | Admitting: PSYCHIATRY & NEUROLOGY
Payer: MEDICARE

## 2020-05-12 DIAGNOSIS — F22: ICD-10-CM

## 2020-05-12 DIAGNOSIS — Z79.899: ICD-10-CM

## 2020-05-12 DIAGNOSIS — Z53.29: ICD-10-CM

## 2020-05-12 DIAGNOSIS — F29: Primary | ICD-10-CM

## 2020-05-12 DIAGNOSIS — F25.0: ICD-10-CM

## 2020-05-12 NOTE — NUR
pt resting in secured room with lights dimmed. no needs expressed. sitter 
remains at doorway for continuous monitoring.

## 2020-05-12 NOTE — NUR
REPORT RECEIVED FROM JOJO BELTRAN. ASSUMING PRIMARY CARE OF PT. 



PT ASLEEP ON [employer]. SI PRECAUTIONS IMPLEMENTED. SITTER OUTSIDE OF ROOM 
MONITORING PT. RN TO ASSESS AND OBTAIN VS. RN TO CONTINUE TO MONITOR.

## 2020-05-12 NOTE — NUR
pt resting in secured room with lights dimmed. no needs expressed. breakfast 
tray ordered. sitter remains at doorway for continuous monitoring.

## 2020-05-12 NOTE — NUR
pt resting in secured room with lights dimmed. juice requested and provided. no 
other needs expressed. sitter remains at doorway for continuous monitoring.

## 2020-05-12 NOTE — NUR
PT AWAKE, CALM AND COOPERATIVE AT THIS TIME. PT REQUESTING FOOD. PROVIDED A 
MEAL TRAY FROM THE [employer] CART. PT GRATEFUL. DENIES ANY OTHER NEEDS AT THIS 
TIME. SITTER OUTSIDE DOOR. WILL CONTINUE TO MONITOR.

## 2020-05-12 NOTE — NUR
RN ANSWERED PT'S CALL LIGHT. PT REQUESTING FOOD AND JUICE. PT PARANOID BY 
"GARAGE DOORS" IN ROOM STATING THAT THERE IS "BUGS" CRAWLING OUT OF THEM AND 
WANTS TO CHANGE ROOMS. RN INFORMED PT THAT THERE ARE NO BUGS AND THATS WHERE WE 
KEEP OUR SUPPLIES. DIET TRAY ORDERED BY NOC RN REPORT. RN TO GET PT JUICE.

## 2020-05-12 NOTE — NUR
Break RN: Patient sleeping in [facility]. Respirations even and unlabored. Sitter 
outside, room secured, belongings in locked cabinet.

## 2020-06-23 ENCOUNTER — HOSPITAL ENCOUNTER (EMERGENCY)
Dept: HOSPITAL 8 - ED | Age: 55
Discharge: HOME | End: 2020-06-23
Payer: MEDICARE

## 2020-06-23 VITALS — WEIGHT: 163.14 LBS | HEIGHT: 65 IN | BODY MASS INDEX: 27.18 KG/M2

## 2020-06-23 VITALS — DIASTOLIC BLOOD PRESSURE: 93 MMHG | SYSTOLIC BLOOD PRESSURE: 154 MMHG

## 2020-06-23 DIAGNOSIS — F43.20: ICD-10-CM

## 2020-06-23 DIAGNOSIS — I10: ICD-10-CM

## 2020-06-23 DIAGNOSIS — F30.2: Primary | ICD-10-CM

## 2020-06-23 PROCEDURE — 86803 HEPATITIS C AB TEST: CPT

## 2020-06-23 PROCEDURE — 99284 EMERGENCY DEPT VISIT MOD MDM: CPT

## 2020-06-23 PROCEDURE — 36415 COLL VENOUS BLD VENIPUNCTURE: CPT

## 2020-06-23 PROCEDURE — 87340 HEPATITIS B SURFACE AG IA: CPT

## 2020-06-23 PROCEDURE — 86706 HEP B SURFACE ANTIBODY: CPT

## 2020-06-23 PROCEDURE — 87806 HIV AG W/HIV1&2 ANTB W/OPTIC: CPT

## 2020-06-23 PROCEDURE — 96372 THER/PROPH/DIAG INJ SC/IM: CPT

## 2020-06-23 PROCEDURE — 86705 HEP B CORE ANTIBODY IGM: CPT

## 2020-06-23 PROCEDURE — G0475 HIV COMBINATION ASSAY: HCPCS

## 2020-06-23 NOTE — NUR
PT BIB FOR THROWING POSSIBLE FECAL OR URINE ON A DEPUTY. PT SCREAMING ON WAY 
INTO HOSPITAL "YOU CANT DO THIS! IM A FUCKING LAW ABIDING CITIZEN". PT 
UNCOOPERATIVE WITH RN EFFORTS TO OBTAIN HISTORY AND PHYSICAL. PT MAEx4, SMOOTH 
STEADY GAIT, RESP WNL, ABC INTACT ON VISUAL ASSESSMENT. WCTM.

## 2021-01-01 NOTE — ED NOTES
Baby Girl Imani Valente  Mother's Name: Imani Valente  Delivering Obstetrician: Dr. Flakito Traylor on 2021    Called to the delivery of a 36 week 3 day female infant for C/S. Infant born by  section. Mother is a 25year old  1 Para 0 female with past medical history of:  PreE w/o SF  Drug use during pregnancy - THC+ 21 and 10/20/21  Oligohydramnios  Fetal Bolton Syndrome (High risk for Monosomy X on NIPT)  BMI 35.7      MOTHER'S HISTORY AND LABS:  Prenatal care: early. Prenatal labs: Information for the patient's mother:  Lachelle Thaisarlen [[id number]]     Lab Results   Component Value Date/Time    RUBG 22021 03:45 PM    HEPBSAG NONREACTIVE 2021 03:45 PM    HIVAG/AB NONREACTIVE 2021 03:45 PM    TREPG NONREACTIVE 2021 12:31 PM    LABCHLA NEGATIVE 2021 03:27 PM    GONORRHEAPRO NEGATIVE 2021 03:27 PM    [address] POSITIVE 2021 12:31 PM    LABANTI NEGATIVE 2021 12:31 PM      GBS  Information for the patient's mother:  Lachelle Blunt [[id number]]     Specimen Description   Date Value Ref Range Status   2021 . VAGINA  Final     Special Requests   Date Value Ref Range Status   2021 NOT REPORTED  Final     Culture   Date Value Ref Range Status   2021 NEGATIVE FOR GROUP B STREPTOCOCCI  Final      Information for the patient's mother:  Lachelle Plascenciaarlen [[id number]]     Past Surgical History:   Procedure Laterality Date    SHOULDER SURGERY        Information for the patient's mother:  Lachelle Blutn [[id number]]   History reviewed. No pertinent past medical history. PRENATAL LAB RESULTS:   Blood Type/Rh: A pos  Antibody Screen: negative  Hemoglobin, Hematocrit, Platelets: 59.4 / 56.8 / 322  Rubella: immune  T.  Pallidum, IgG: non-reactive   Hepatitis B Surface Antigen: non-reactive   Hepatitis C antibody: not available   HIV: non-reactive   Sickle Cell Screen: not available  Gonorrhea: PT denies needs at this time    negative  Chlamydia: negative  Urine culture: negative, date: 3/29/21     1 hour Glucose Tolerance Test: 91     Group B Strep: negative  Cystic Fibrosis Screen: negative  First Trimester Screen: not available  MSAFP/Multiple Markers: normal  Non-Invasive Prenatal Testing: high risk Monosomy X (1:2), remainder normal    Tobacco: quit smoking; Alcohol: previous use; Drug use: Current marijuana. Pregnancy complications: drug use. Maternal antibiotics: azithromycin and cefazolin.  complications: fetal tachycardia. Rupture of Membranes: Date/time: 10/21/21 @ 4098, artificial. Amniotic fluid: Clear    DELIVERY: Infant born by  section at 1412. Anesthesia: epidural    RESUSCITATION: APGAR One: 8 APGAR Five: 9 . Infant brought to radiant warmer. Dried, suctioned and warmed. cried spontaneously. Initial heart rate was above 100 and infant was breathing spontaneously. Infant given no resuscitation but was bulb-suctioned in the mouth and then the nose. Infant continued to sound congested so a catheter was passed down each nostril and fluid was suctioned  with improvement in Appearance (skin color). Patient has retractions and is continuing to grunt when breathing on room air. Pregnancy history, family history and nursing notes reviewed. Physical Exam:   Constitutional: Alert, vigorous. No distress. Head: Normocephalic. Normal fontanelles. No facial anomaly. Cephalohematoma present on left posterior aspect of head. Ears: External ears normal.   Nose: Nostrils without airway obstruction, especially since suction catheter was easily passed through. Patient sounds congested. Mouth/Throat: Mucous membranes are moist. Palate intact. Oropharynx is clear. Eyes: no drainage  Neck: Full passive range of motion. No excess neck webbing noted on exam.  Cardiovascular: Normal rate, regular rhythm, S1 & S2 normal.  Pulses are palpable. No murmur.   Pulmonary/Chest: Effort & breath sounds normal. There is normal air entry. No nasal flaring, or stridor, however grunting and retractions are present. Nipples are widely spaced and the left nipple is lower than the right. Abdominal: Soft. No distention, no masses, no organomegaly. Umbilicus-  3 vessel cord. Genitourinary: Normal  female genitalia. Musculoskeletal: Normal ROM. Neg- [address]. Clavicles & spine intact. No sacral dimple  Neurological: Alert during exam. Tone normal for gestation. Suck & root normal. Symmetric [place]. Symmetric grasp & movement. Skin: Skin is warm & dry. Capillary refill < 2 seconds. Turgor is normal. No rash noted. No cyanosis, mottling, or pallor. No jaundice. ASSESSMENT:  Term AGA newly born Infant, female doing well. Mother had elevated temperature and was diagnosed chorioamnionitis, and infant was tachycardic during delivery. She sounds congested while having retractions but no respiratory support was given. Choanal atresia was ruled out by successfully passing the suction catheter through both nostrils. Patient's NIPT was concerning for Bolton Syndrome. Infant has been exposed to [facility] in utero. PLAN:  Admit to NICU for respiratory distress and r/o sepsis. Will order karyotype to confirm if infant has Bolton Syndrome.     Electronically signed by: Chary Garcia MD 2021  2:32 PM

## 2023-02-24 NOTE — DISCHARGE PLANNING
Alert Team Rounds Note: Spoke briefly with patient who was calm and compliant. Patient requested food, reported being hungry. Patient given meal. Patient continues to wait for psychiatric hospitalization.  Va Maldonado, Ph.d.   How Severe Are Your Spot(S)?: moderate What Type Of Note Output Would You Prefer (Optional)?: Standard Output What Is The Reason For Today's Visit?: Full Body Skin Examination What Is The Reason For Today's Visit? (Being Monitored For X): the development of new lesions

## 2024-02-01 ENCOUNTER — APPOINTMENT (OUTPATIENT)
Dept: RADIOLOGY | Facility: MEDICAL CENTER | Age: 59
End: 2024-02-01
Attending: STUDENT IN AN ORGANIZED HEALTH CARE EDUCATION/TRAINING PROGRAM
Payer: MEDICARE

## 2024-02-01 ENCOUNTER — HOSPITAL ENCOUNTER (EMERGENCY)
Facility: MEDICAL CENTER | Age: 59
End: 2024-02-01
Attending: STUDENT IN AN ORGANIZED HEALTH CARE EDUCATION/TRAINING PROGRAM
Payer: MEDICARE

## 2024-02-01 VITALS
DIASTOLIC BLOOD PRESSURE: 97 MMHG | HEIGHT: 69 IN | RESPIRATION RATE: 17 BRPM | OXYGEN SATURATION: 98 % | TEMPERATURE: 97.2 F | BODY MASS INDEX: 22.76 KG/M2 | SYSTOLIC BLOOD PRESSURE: 171 MMHG | HEART RATE: 107 BPM | WEIGHT: 153.66 LBS

## 2024-02-01 DIAGNOSIS — R46.2 BIZARRE BEHAVIOR: ICD-10-CM

## 2024-02-01 DIAGNOSIS — M54.2 NECK PAIN: ICD-10-CM

## 2024-02-01 LAB — POC BREATHALIZER: 0 PERCENT (ref 0–0.01)

## 2024-02-01 PROCEDURE — A9270 NON-COVERED ITEM OR SERVICE: HCPCS | Performed by: STUDENT IN AN ORGANIZED HEALTH CARE EDUCATION/TRAINING PROGRAM

## 2024-02-01 PROCEDURE — 700102 HCHG RX REV CODE 250 W/ 637 OVERRIDE(OP): Performed by: STUDENT IN AN ORGANIZED HEALTH CARE EDUCATION/TRAINING PROGRAM

## 2024-02-01 PROCEDURE — 302970 POC BREATHALIZER: Performed by: STUDENT IN AN ORGANIZED HEALTH CARE EDUCATION/TRAINING PROGRAM

## 2024-02-01 PROCEDURE — 99285 EMERGENCY DEPT VISIT HI MDM: CPT

## 2024-02-01 PROCEDURE — 90791 PSYCH DIAGNOSTIC EVALUATION: CPT

## 2024-02-01 PROCEDURE — 72125 CT NECK SPINE W/O DYE: CPT

## 2024-02-01 RX ORDER — OLANZAPINE 5 MG/1
10 TABLET ORAL ONCE
Status: COMPLETED | OUTPATIENT
Start: 2024-02-01 | End: 2024-02-01

## 2024-02-01 RX ORDER — ACETAMINOPHEN 500 MG
1000 TABLET ORAL ONCE
Status: COMPLETED | OUTPATIENT
Start: 2024-02-01 | End: 2024-02-01

## 2024-02-01 RX ORDER — METHOCARBAMOL 500 MG/1
500 TABLET, FILM COATED ORAL 4 TIMES DAILY PRN
Qty: 6 TABLET | Refills: 0 | Status: SHIPPED | OUTPATIENT
Start: 2024-02-01

## 2024-02-01 RX ADMIN — OLANZAPINE 10 MG: 5 TABLET, FILM COATED ORAL at 03:46

## 2024-02-01 RX ADMIN — ACETAMINOPHEN 1000 MG: 500 TABLET ORAL at 03:46

## 2024-02-01 ASSESSMENT — PAIN DESCRIPTION - PAIN TYPE: TYPE: ACUTE PAIN
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
## 2024-02-01 NOTE — ED PROVIDER NOTES
"CHIEF COMPLAINT  Chief Complaint   Patient presents with    Neck Pain     Pt reports he has an \"intact internal decapitation\", \"severe, level 35 pain, from jostling around too much\". Pt reports this injury occurred in 1982. Pt requesting to be on 8L oxygen and a full hard neck collar.       LIMITATION TO HISTORY   Select:     HPI    Tomy Desai is a 58 y.o. male who presents to the Emergency Department for evaluation of neck pain patient has some tangential speech history has somewhat difficult but feel adequate patient reports that he has a history of an intact internal decapitation that occurred in 1992 reports that he is post to be in a hard neck collar 24/7 since 1982 with history of OCD and \"megalomaniac\" reports next been causing severe pain over the last several days denies any numbness or weakness    OUTSIDE HISTORIAN(S):  Select:    EXTERNAL RECORDS REVIEWED  Select:       PAST MEDICAL HISTORY  History reviewed. No pertinent past medical history.  .    SURGICAL HISTORY  History reviewed. No pertinent surgical history.      FAMILY HISTORY  No family history on file.       SOCIAL HISTORY  Social History     Socioeconomic History    Marital status: Single     Spouse name: Not on file    Number of children: Not on file    Years of education: Not on file    Highest education level: Not on file   Occupational History    Not on file   Tobacco Use    Smoking status: Every Day    Smokeless tobacco: Never   Substance and Sexual Activity    Alcohol use: Yes    Drug use: Yes     Types: Inhaled     Comment: thc    Sexual activity: Not on file   Other Topics Concern    Not on file   Social History Narrative    Not on file     Social Determinants of Health     Financial Resource Strain: Not on file   Food Insecurity: Not on file   Transportation Needs: Not on file   Physical Activity: Not on file   Stress: Not on file   Social Connections: Not on file   Intimate Partner Violence: Not on file   Housing Stability: " "Not on file         CURRENT MEDICATIONS  No current facility-administered medications on file prior to encounter.     No current outpatient medications on file prior to encounter.           ALLERGIES  No Known Allergies    PHYSICAL EXAM  VITAL SIGNS:BP (!) 171/97   Pulse (!) 107   Temp 36.2 °C (97.2 °F) (Temporal)   Resp 17   Ht 1.74 m (5' 8.5\")   Wt 69.7 kg (153 lb 10.6 oz)   SpO2 98%   BMI 23.02 kg/m²       GENERAL: Awake and alert, well-kempt, patient pacing in his room  HEAD: Normocephalic and atraumatic  NECK: Normal range of motion, without meningismus  EYES: Pupils Equal, Round, Reactive to Light, extraocular movements intact, conjunctiva white  ENT: Mucous membranes moist, oropharynx clear  PULMONARY: Normal effort, clear to auscultation  CARDIOVASCULAR: No murmurs, clicks or rubs, peripheral pulses 2+  ABDOMINAL: Soft, non-tender, no guarding or rigidity present, no pulsatile masses  BACK: no midline tenderness, no costovertebral tenderness  NEUROLOGICAL: Alert, oriented to person/place/time, Cranial nerves II-XII intact, strength 5/5 throughout, sensation intact to light touch throughout, speech normal, gait normal, DTR 1+ lower extremities, no extremity dysmetria, normal gait EXTREMITIES: No edema, normal to inspection  SKIN: Warm and dry.  PSYCHIATRIC: Affect is agitated odd patient with tangential speech denies hallucination not responding to internal stimuli, denies SI or homicidal ideation      LABS  Labs Reviewed   POC BREATHALIZER - Normal   URINE DRUG SCREEN         RADIOLOGY  I independently reviewed and interpreted the images obtained today in the ER.    Radiologist interpretation:   CT-CSPINE WITHOUT PLUS RECONS   Final Result      No acute osseous injury identified           COURSE & MEDICAL DECISION MAKING    ED COURSE:        INTERVENTIONS BY ME:  Medications   acetaminophen (Tylenol) tablet 1,000 mg (1,000 mg Oral Given 2/1/24 4541)   OLANZapine (ZyPREXA) tablet 10 mg (10 mg Oral " Given 2/1/24 0465)       Response on recheck:  3:30 AM patient pacing somewhat agitated does not appear to be psychotic, will provide olanzapine to allow for CT scan and repeat psychiatric examination   4:30 AM after receiving olanzapine patient is more relaxed calm and cooperative  5:50 AM spoke to Sushila from alert team who assessed patient at my request Prateek for patient's behavior, we are in agreement that the patient has some bizarre behavior but is not exhibiting signs or symptoms of acute psychosis and appears to be caring for himself adequately without suicidal ideation suitable for discharge without need for inpatient psychiatric care.    6:15 AM called by nurse patient requesting prescription of muscle relaxant will prescribe 5 pills of Robaxin       INITIAL ASSESSMENT, COURSE AND PLAN  Care Narrative:     Patient presenting with neck pain patient without neurologic deficits on exam no trauma, given severity of his neck pain CT scan obtained without evidence of significant abnormality with some signs of degenerative changes no neurologic deficits.  Patient with some odd and somewhat bizarre behavior tangential speech but otherwise not responding to internal stimuli without hallucinations or other signs of acute psychosis consider placement of legal hold however I do not feel this is indicated at this time the patient appears to be caring for himself well does not appear to be incapacitated to the point that he cannot care for himself or is a danger to himself or others         ADDITIONAL PROBLEM LIST    DISPOSITION AND DISCUSSIONS  Discussion of management with other Q or appropriate source(s): None         Escalation of care considered, and ultimately not performed:acute inpatient care management, however at this time, the patient is most appropriate for outpatient management consider transfer to inpatient psychiatric bed  FINAL DIAGNOSIS  1. Neck pain    2. Bizarre behavior             Electronically  signed by: Siddhartha Soria DO ,6:08 AM 02/01/24

## 2024-02-01 NOTE — ED NOTES
"Pt wandered into [address] naked. Pt states, \"I need three blankets from the oven stat please\"  Pt provided with blankets    "

## 2024-02-01 NOTE — ED NOTES
"Pt slouched in bed requesting, \"10mg demerol stat for a nerve block please\". Pt educated on ED process, ERP contacted and aware  "

## 2024-02-01 NOTE — ED TRIAGE NOTES
"Tomy Desai  58 y.o. male  Chief Complaint   Patient presents with    Neck Pain     Pt reports he has an \"intact internal decapitation\", \"severe, level 35 pain, from jostling around too much\". Pt reports this injury occurred in 1982. Pt requesting to be on 8L oxygen and a full hard neck collar.     Pt ambulatory with steady gait to triage for above complaint. Pt reporting he has made his own traction neck brace by tying two socks together. Pt additionally reports that he is a type 1 diabetic but does not require insulin and it is just a \"blood sugar thing\".    Pt is alert, oriented, and follows commands. Pt speaking in full sentences and responds appropriately to questions. No acute distress noted in triage and respirations are even and unlabored.     Pt placed in lobby and educated on triage process. Pt encouraged to alert staff for any changes in condition.    Vitals:    02/01/24 0232   BP: (!) 171/97   Pulse: (!) 107   Resp: 17   Temp: 36.2 °C (97.2 °F)   SpO2: 98%       "

## 2024-02-02 ENCOUNTER — HOSPITAL ENCOUNTER (EMERGENCY)
Facility: MEDICAL CENTER | Age: 59
End: 2024-02-03
Attending: EMERGENCY MEDICINE
Payer: MEDICARE

## 2024-02-02 DIAGNOSIS — R45.851 SUICIDAL IDEATION: ICD-10-CM

## 2024-02-02 DIAGNOSIS — F19.94 SUBSTANCE INDUCED MOOD DISORDER (HCC): ICD-10-CM

## 2024-02-02 LAB
AMPHET UR QL SCN: POSITIVE
BARBITURATES UR QL SCN: NEGATIVE
BENZODIAZ UR QL SCN: POSITIVE
BZE UR QL SCN: NEGATIVE
CANNABINOIDS UR QL SCN: POSITIVE
ETHANOL BLD-MCNC: 20 MG/DL
FENTANYL UR QL: NEGATIVE
METHADONE UR QL SCN: NEGATIVE
OPIATES UR QL SCN: NEGATIVE
OXYCODONE UR QL SCN: NEGATIVE
PCP UR QL SCN: NEGATIVE
PROPOXYPH UR QL SCN: NEGATIVE

## 2024-02-02 PROCEDURE — 700111 HCHG RX REV CODE 636 W/ 250 OVERRIDE (IP): Performed by: EMERGENCY MEDICINE

## 2024-02-02 PROCEDURE — 90791 PSYCH DIAGNOSTIC EVALUATION: CPT

## 2024-02-02 PROCEDURE — 90471 IMMUNIZATION ADMIN: CPT

## 2024-02-02 PROCEDURE — 82077 ASSAY SPEC XCP UR&BREATH IA: CPT

## 2024-02-02 PROCEDURE — 99285 EMERGENCY DEPT VISIT HI MDM: CPT

## 2024-02-02 PROCEDURE — 80307 DRUG TEST PRSMV CHEM ANLYZR: CPT

## 2024-02-02 PROCEDURE — 36415 COLL VENOUS BLD VENIPUNCTURE: CPT

## 2024-02-02 PROCEDURE — 90715 TDAP VACCINE 7 YRS/> IM: CPT | Performed by: EMERGENCY MEDICINE

## 2024-02-02 RX ADMIN — CLOSTRIDIUM TETANI TOXOID ANTIGEN (FORMALDEHYDE INACTIVATED), CORYNEBACTERIUM DIPHTHERIAE TOXOID ANTIGEN (FORMALDEHYDE INACTIVATED), BORDETELLA PERTUSSIS TOXOID ANTIGEN (GLUTARALDEHYDE INACTIVATED), BORDETELLA PERTUSSIS FILAMENTOUS HEMAGGLUTININ ANTIGEN (FORMALDEHYDE INACTIVATED), BORDETELLA PERTUSSIS PERTACTIN ANTIGEN, AND BORDETELLA PERTUSSIS FIMBRIAE 2/3 ANTIGEN 0.5 ML: 5; 2; 2.5; 5; 3; 5 INJECTION, SUSPENSION INTRAMUSCULAR at 21:03

## 2024-02-03 VITALS
HEART RATE: 90 BPM | SYSTOLIC BLOOD PRESSURE: 118 MMHG | BODY MASS INDEX: 23.49 KG/M2 | OXYGEN SATURATION: 98 % | TEMPERATURE: 98.2 F | WEIGHT: 155 LBS | DIASTOLIC BLOOD PRESSURE: 72 MMHG | HEIGHT: 68 IN | RESPIRATION RATE: 16 BRPM

## 2024-02-03 PROCEDURE — A9270 NON-COVERED ITEM OR SERVICE: HCPCS | Performed by: EMERGENCY MEDICINE

## 2024-02-03 PROCEDURE — 700102 HCHG RX REV CODE 250 W/ 637 OVERRIDE(OP): Performed by: EMERGENCY MEDICINE

## 2024-02-03 RX ORDER — CYCLOBENZAPRINE HCL 10 MG
10 TABLET ORAL ONCE
Status: COMPLETED | OUTPATIENT
Start: 2024-02-03 | End: 2024-02-03

## 2024-02-03 RX ADMIN — CYCLOBENZAPRINE 10 MG: 10 TABLET, FILM COATED ORAL at 11:43

## 2024-02-03 ASSESSMENT — PAIN DESCRIPTION - PAIN TYPE: TYPE: ACUTE PAIN

## 2024-02-03 NOTE — DISCHARGE PLANNING
"Informed pt that he has been declined by [facility], as he was discharged approximately one week ago and needs to follow his discharge plan. Encouraged pt to call his pharmacy and  meds. Pt stated \"if I can't go inpatient, then let me go. I won't kill myself.\" Pt will access food and shelter via the [facility]. Requests shoes and socks, which were provided.  Educated pt about transitional living and community addiction resources. Referred to [facility] for outpatient medication management. Pt declines resources, stating \"that's okay, I have something going on at [facility].\" Encouraged pt to take resources, states \"I'll file that in the future reference file.\"  "

## 2024-02-03 NOTE — ED NOTES
Medication history reviewed with PT at bedside    Med rec is complete per PT reporting    Allergies reviewed.     Patient denies any outpatient antibiotics in the last 30 days.     Patient is not taking anticoagulants.    Preferred pharmacy for this visit - [facility] ([phone])
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
## 2024-02-03 NOTE — ED NOTES
Late breakfast tray provided to pt. Able to feed self and ate 100% of it.  Vitals taken. Pt cooperative with staff.

## 2024-02-03 NOTE — ED NOTES
Checked on bed, with unlabored respirations. No safety risk noted  Sleeping  Sitter - 1:1 with continuous visual monitoring by Trained Personnel  Continued safety precaution  Davonrdavid in low position, side rail up for pt safety.   No needs identified at the moment

## 2024-02-03 NOTE — DISCHARGE PLANNING
Alert Team/Behavioral Health   Note:      - [facility]:  (Rosemarie) called to confirm patient was still needing placement. Will call back when they have an update. No beds currently available.    - [facility]: Talked to Intake Counselor (Brandin). They will call back regarding referral status. No beds currently available  @1120: Intake Counselor (Yessica) called. Patient declined by doctor due to patient maxing out therapeutic benefits. Notified Alert Team RN (Kay HUERTA) through Voalte message.    - [facility] : Crisis Counselor (Nithin) called to confirm patient was still needing placement. Will call back when they have an update. No beds currently available.     Ice pack given:    __X___yes _____no    Discharge instructions reviewed and given to patient:    __X___yes _____no    Discharged via:    __X___amulatory    _____wheelchair    _____stretcher    Stable on discharge:    __X___yes ____no

## 2024-02-03 NOTE — DISCHARGE PLANNING
[facility] ED Behavioral Health Fax Referral      [facility] ED Behavioral Health Alert Team:  [phone]    Referral: Legal Hold    Intervention: Patient referral to [facility] inpatient  facillity    Legal Hold Initiated: Date: 02/02/24 Time: 1630    Patient’s Insurance Listed on Face Sheet: [employer]    Referrals sent to: [facility], [facility], [facility]    Referrals faxed by Sukhdev Duff RN, [facility]    This referral contains the following information:  Face sheet __x__  Page 1 and Page 2 of Legal Hold __x__  Alert Team Assessment/Psych Assessment __x__  Head to toe physical exam ____  Urine Drug Screen ___x_  Blood Alcohol __x__  Vital signs _x___  Pregnancy test when applicable __x_  Medications list ____  Covid screening ____    Plan: Patient will transfer to mental health facility once acceptance is obtained

## 2024-02-03 NOTE — DISCHARGE PLANNING
[facility] ED Behavioral Health Fax Referral      [facility] ED Behavioral Health Alert Team:  [phone]    Referral: Legal Hold    Intervention: Patient referral to [facility] inpatient  facillity    Legal Hold Initiated: Date: 02/02/2024 Time: 2016    Patient’s Insurance Listed on Face Sheet: Medicare and [employer] insurance plans     Referrals sent to: [facility], [facility], [facility]     Referrals faxed by CECY Conti    This referral contains the following information:  Face sheet __x__  Page 1 and Page 2 of Legal Hold __x__  Alert Team Assessment/Psych Assessment __x__  Head to toe physical exam __x__  Urine Drug Screen __x__  Blood Alcohol __x__  Vital signs __x__  Pregnancy test when applicable ___  Medications list __x__  Covid screening ____    Plan: Patient will transfer to mental health facility once acceptance is obtained

## 2024-02-03 NOTE — DISCHARGE SUMMARY
ED Observation Discharge Summary    Patient:Tomy Desai  Patient : 1965  Patient MRN: [id number]  Patient PCP: Pcp Pt States None    Admit Date: 2024  Discharge Date and Time: 24 1:29 PM  Discharge Diagnosis:   1.  Substance-induced mood disorder  Suicidal statements  Discharge Attending: Zoya Nguyen M.D.  Discharge Service: ED Observation    ED Course  Tomy is a 58 y.o. male who was evaluated at [facility].  He initially presented to the emergency department yesterday with concern for suicidal ideation.  He reported ongoing methamphetamine use, and also reported some chronic pain that was exacerbated by cold exposure.  The temperature dropped precipitously prior to patient arrival, and he reported that he was not able to get into the shelter.     On laboratory evaluation urine drug screen is positive for amphetamines consistent with reported history.    He is placed under emergency department observation, and watched in the emergency department.  He was seen and evaluated by behavioral health yesterday, who Noted that he was again reporting suicidal ideation.  Also noted that he had been seen in the emergency department 1 day prior for behavioral health consultation, discharged with a taxi voucher to the [facility].  He also reports recent discharge from [facility].    On my assessment this morning he is well-appearing.  He has not had any aggressive behavior, no agitation.  His preference is for transport to inpatient treatment at [facility].  He was again seen and evaluated by behavioral health RN.  Behavioral health specialist contacted [facility] who stated they would not be able to take the patient as an inpatient.  This was relayed to the patient, he stated if he cannot go inpatient to [facility] he would prefer to be discharged.  He no longer reports any suicidal thoughts nor thoughts of  "self-harm, states he does not plan to harm himself, and simply wanted treatment at [facility].  He also asked for some shoes and socks, which were provided.  Plan is to return to [facility] for shelter.    Legal hold was discontinued as the patient is no longer reporting suicidal ideation.  Believe some of this was likely substance-induced, he is not currently showing any signs or symptoms of methamphetamine intoxication.  Additionally believe some of this may have been secondary gain, as he did seem to genuinely want to spend time at [facility] as an inpatient.    Discharge Exam:  /72   Pulse 90   Temp 36.8 °C (98.2 °F) (Temporal)   Resp 16   Ht 1.727 m (5' 8\")   Wt 70.3 kg (155 lb)   SpO2 98%   BMI 23.57 kg/m² .    Constitutional: Awake and alert. Afebrile.  HENT:  Atraumatic, normocephalic  Neck: Normal and painless range of motion, supple  Cardiovascular: No tachycardia  Thorax & Lungs: Normal work of breathing, no tachypnea  Abdomen: Nondistended  Skin:  Warm, dry, intact  Extremities: No abnormal swelling nor deformity  Neurologic: Normal gait  Psychiatric: Calm and cooperative, no tangential speech, no thoughts of self-harm, no hallucinations, is not responding to internal stimuli    Labs  Results for orders placed or performed during the hospital encounter of 02/02/24   Urine Drug Screen   Result Value Ref Range    Amphetamines Urine Positive (A) Negative    Barbiturates Negative Negative    Benzodiazepines Positive (A) Negative    Cocaine Metabolite Negative Negative    Fentanyl, Urine Negative Negative    Methadone Negative Negative    Opiates Negative Negative    Oxycodone Negative Negative    Phencyclidine -Pcp Negative Negative    Propoxyphene Negative Negative    Cannabinoid Metab Positive (A) Negative   DIAGNOSTIC ALCOHOL (BA)   Result Value Ref Range    Diagnostic Alcohol 20.0 (H) <10.1 mg/dL       Radiology  No orders to display       Medications: "   Discharge Medication List as of 2/3/2024 12:01 PM          My final assessment includes:  1.  Substance-induced mood disorder  2.  Situational depression  Upon Reevaluation, the patient's condition has: Improved; and will be discharged.    Patient discharged from ED Observation status at 12:24 PM, 2/3/2024    Total time spent on this ED Observation discharge encounter is 15 minutes

## 2024-02-03 NOTE — ED PROVIDER NOTES
"ED Provider Note    CHIEF COMPLAINT  Chief Complaint   Patient presents with    Suicidal Ideation     Patient reports SI since he was a \"puppy\". Stabbed right hand because someone told him that where his heart is. Abrasion noted on right palm and left neck. Requesting to be placed on legal for 3 weeks. Endorses methamphetamine use. Seen in this ED yesterday for \" intact internal decapitation\". Verbally aggressive to [facility] - received Versed 3mg and Haldol 5 mg IV.       EXTERNAL RECORDS REVIEWED  Inpatient Notes patient seen in this ER yesterday for neck pain which is chronic    HPI/ROS  LIMITATION TO HISTORY   Select: Behavior  OUTSIDE HISTORIAN(S):  None    Tomy Desai is a 58 y.o. male who presents to the emergency department with complaint of suicidal ideations.  Patient does admit to ongoing methamphetamine use.  States that he was unable to get into the local shelter.  Now primary complaining of cold exposure which is exacerbating his chronic neck pain.  At this point states that he is suicidal and not wanting to live anymore.    Unknown last tetanus.  In addition to methamphetamine use does mid to alcohol use.    PAST MEDICAL HISTORY       SURGICAL HISTORY  patient denies any surgical history    FAMILY HISTORY  No family history on file.    SOCIAL HISTORY  Social History     Tobacco Use    Smoking status: Every Day    Smokeless tobacco: Never   Substance and Sexual Activity    Alcohol use: Yes    Drug use: Yes     Types: Inhaled     Comment: thc    Sexual activity: Not on file       CURRENT MEDICATIONS  Home Medications       Reviewed by Andrzej Sampson R.N. (Registered Nurse) on 02/02/24 at 1952  Med List Status: Partial     Medication Last Dose Status   methocarbamol (ROBAXIN) 500 MG Tab  Active                    ALLERGIES  No Known Allergies    PHYSICAL EXAM  VITAL SIGNS: BP 91/58   Pulse 99   Temp 36.9 °C (98.4 °F) (Oral)   Resp 16   Ht 1.727 m (5' 8\")   Wt 70.3 kg (155 lb)   SpO2 99%   BMI " 23.57 kg/m²      Pulse ox interpretation: I interpret this pulse ox as normal.  Constitutional: Alert in no apparent distress. unkempt  HENT: No signs of trauma, Bilateral external ears normal, Nose normal.   Eyes: Pupils are equal and reactive   Neck: Normal range of motion, No tenderness, Supple  Cardiovascular: Regular rate and rhythm, no murmurs.   Thorax & Lungs: Normal breath sounds, No respiratory distress, No wheezing, No chest tenderness.   Abdomen: Bowel sounds normal, Soft, No tenderness  Skin: Warm, Dry, No erythema, No rash.   Extremities: Intact distal pulses  Musculoskeletal: Good range of motion in all major joints. No tenderness to palpation or major deformities noted.   Neurologic: Alert , Normal motor function, Normal sensory function, No focal deficits noted.   Psychiatric: Psychiatric affect.  Stating suicidal.        DIAGNOSTIC STUDIES / PROCEDURES      LABS  Results for orders placed or performed during the hospital encounter of 02/02/24   Urine Drug Screen   Result Value Ref Range    Amphetamines Urine Positive (A) Negative    Barbiturates Negative Negative    Benzodiazepines Positive (A) Negative    Cocaine Metabolite Negative Negative    Fentanyl, Urine Negative Negative    Methadone Negative Negative    Opiates Negative Negative    Oxycodone Negative Negative    Phencyclidine -Pcp Negative Negative    Propoxyphene Negative Negative    Cannabinoid Metab Positive (A) Negative   DIAGNOSTIC ALCOHOL (BA)   Result Value Ref Range    Diagnostic Alcohol 20.0 (H) <10.1 mg/dL         COURSE & MEDICAL DECISION MAKING    ED Observation Status? Yes; I am placing the patient in to an observation status due to a diagnostic uncertainty as well as therapeutic intensity. Patient placed in observation status at 2030 PM, 2/2/2024.     Observation plan is as follows: Patient represented to the ER.  Admitting to polysubstance include alcohol and amphetamines.  At this point suicidal.    Seen by behavioral  health and much more clear from a mental health/intoxicant standpoint.  Again with his new suicidal ideations will place patient on legal hold.      INITIAL ASSESSMENT, COURSE AND PLAN  Care Narrative: See above observation plan   DISPOSITION AND DISCUSSIONS  I have discussed management of the patient with the following physicians and LILLIAM's:  none    Discussion of management with other [facility] or appropriate source(s): Behavioral Health for psych evaluation      58-year-old male presenting to the [facility] part with above presentation.  Acute suicidal ideations.  Given polysubstance abuse history.  Given legal hold placement will continue psychiatric care and placement search    FINAL DIAGNOSIS  1. Suicidal ideation           Electronically signed by: Gregorio Antonio M.D., 2/2/2024 8:29 PM

## 2024-02-03 NOTE — DISCHARGE INSTRUCTIONS
Please follow-up with [facility], return to the emergency department if you develop any new or worsening symptoms.

## 2024-02-03 NOTE — ED NOTES
Discharge instructions given to pt including follow up with [facility] or returning if no improvement of symptoms or to return if worse. Questions answered by RN. Denies any new complaints. Discharged w/stable vitals and able to ambulate to the lobby with steady gait..

## 2024-02-03 NOTE — ED NOTES
Ambulated to the restroom with steady gait. chaperoned by danielle Henley with door partially open for pt's safety

## 2024-02-03 NOTE — ED NOTES
Pt sleeping, breathing is even and unlabored.     Sitter outside the room in full view of the pt.

## 2024-02-03 NOTE — ED NOTES
Bedside report received from off going RN Saranya, assumed care of patient.  POC discussed with patient. Call light within reach, all needs addressed at this time.       Fall risk interventions in place: Not Applicable (all applicable per [place] Fall risk assessment)   Continuous monitoring: Not Applicable   IVF/IV medications: Not Applicable   Oxygen: Room Air  Bedside sitter: Pt on L2k SI with 1:1 sitter Lory (name), Report given to sitter, checklist completed, and checklist completed, stop sign in doorway  Isolation: Not Applicable

## 2024-02-03 NOTE — ED NOTES
Pt asleep, with normal bilateral chest rise and fall noted    Sitter outside the room 1:1 closed observation in full view of the pt

## 2024-02-03 NOTE — ED NOTES
Pt woke up, pt went to rest room, accompanied by sitter.    Asking for food. Provided juice and sandwich

## 2024-02-13 ENCOUNTER — APPOINTMENT (OUTPATIENT)
Dept: MEDICAL GROUP | Facility: MEDICAL CENTER | Age: 59
End: 2024-02-13
Payer: OTHER GOVERNMENT

## 2024-06-23 ENCOUNTER — HOSPITAL ENCOUNTER (EMERGENCY)
Facility: MEDICAL CENTER | Age: 59
End: 2024-06-23
Attending: EMERGENCY MEDICINE
Payer: OTHER GOVERNMENT

## 2024-06-23 ENCOUNTER — HOSPITAL ENCOUNTER (EMERGENCY)
Facility: MEDICAL CENTER | Age: 59
End: 2024-06-24
Attending: EMERGENCY MEDICINE
Payer: OTHER GOVERNMENT

## 2024-06-23 VITALS
OXYGEN SATURATION: 97 % | HEIGHT: 70 IN | SYSTOLIC BLOOD PRESSURE: 118 MMHG | TEMPERATURE: 97.6 F | HEART RATE: 78 BPM | WEIGHT: 144.29 LBS | RESPIRATION RATE: 16 BRPM | BODY MASS INDEX: 20.66 KG/M2 | DIASTOLIC BLOOD PRESSURE: 70 MMHG

## 2024-06-23 DIAGNOSIS — R44.3 HALLUCINATIONS: ICD-10-CM

## 2024-06-23 DIAGNOSIS — F29 PSYCHOSIS, UNSPECIFIED PSYCHOSIS TYPE (HCC): ICD-10-CM

## 2024-06-23 LAB
AMPHET UR QL SCN: NEGATIVE
BARBITURATES UR QL SCN: NEGATIVE
BENZODIAZ UR QL SCN: NEGATIVE
BZE UR QL SCN: NEGATIVE
CANNABINOIDS UR QL SCN: POSITIVE
FENTANYL UR QL: NEGATIVE
METHADONE UR QL SCN: NEGATIVE
OPIATES UR QL SCN: NEGATIVE
OXYCODONE UR QL SCN: NEGATIVE
PCP UR QL SCN: NEGATIVE
POC BREATHALIZER: 0 PERCENT (ref 0–0.01)
PROPOXYPH UR QL SCN: NEGATIVE

## 2024-06-23 PROCEDURE — 99284 EMERGENCY DEPT VISIT MOD MDM: CPT

## 2024-06-23 PROCEDURE — A9270 NON-COVERED ITEM OR SERVICE: HCPCS | Performed by: EMERGENCY MEDICINE

## 2024-06-23 PROCEDURE — 80307 DRUG TEST PRSMV CHEM ANLYZR: CPT

## 2024-06-23 PROCEDURE — 700102 HCHG RX REV CODE 250 W/ 637 OVERRIDE(OP): Performed by: EMERGENCY MEDICINE

## 2024-06-23 PROCEDURE — 302970 POC BREATHALIZER: Performed by: EMERGENCY MEDICINE

## 2024-06-23 PROCEDURE — 99285 EMERGENCY DEPT VISIT HI MDM: CPT

## 2024-06-23 RX ORDER — ZIPRASIDONE HYDROCHLORIDE 40 MG/1
80 CAPSULE ORAL ONCE
Status: COMPLETED | OUTPATIENT
Start: 2024-06-23 | End: 2024-06-23

## 2024-06-23 RX ADMIN — ZIPRASIDONE HYDROCHLORIDE 80 MG: 40 CAPSULE ORAL at 20:07

## 2024-06-23 NOTE — DISCHARGE INSTRUCTIONS
You were seen in the Emergency Department for psychosis.    You refused any medication and no other medical concerns were identified.  We will provide transferred for you to go to [facility] for further evaluation.    Return to the Emergency Department with other concerns.

## 2024-06-23 NOTE — ED NOTES
[employer] called for ride to [facility]. Pt given voucher.     Discharge teaching and paperwork provided and all questions/concerns answered. Patient discharged to the care of self and ambulated out of the ED.

## 2024-06-23 NOTE — ED PROVIDER NOTES
"ED Provider Note    CHIEF COMPLAINT  Chief Complaint   Patient presents with    Psych Eval     Pt bib ems for auditory and visual hallucination. Denies SI/HI. When asked for other complaints pt told RN that its \"classified\" and when asked if he has any complaints of pain, told RN \"NO COMMENT\".     EXTERNAL RECORDS REVIEWED  Review of records show that the patient was last seen February of 2024 for suicidal ideation.  He was monitored in the emergency department and ultimately discharged.  He has a history of ongoing methamphetamine use.    HPI/ROS  LIMITATION TO HISTORY   Select: Behavior  OUTSIDE HISTORIAN(S):  None    Tomy Desai is a 58 y.o. male who presents to the Emergency Department for psychiatric evaluation. The patient states he needs to be placed on a 72 hour observation hold in order to be transferred to [facility]. The patient states the medications he is on are \"classified\", and states he is \"allergic\" to medication in general. The patient states he is calm at this time and does not want any medications. He denies any alcohol or drug use at this time.  Denies any recent illness or trauma.  Denies suicidal or homicidal ideation.    PAST MEDICAL HISTORY  History reviewed. No pertinent past medical history.     SURGICAL HISTORY  History reviewed. No pertinent surgical history.     FAMILY HISTORY  History reviewed. No pertinent family history.    SOCIAL HISTORY   reports that he has been smoking. He has never used smokeless tobacco. He reports current alcohol use. He reports current drug use. Drug: Inhaled.    CURRENT MEDICATIONS  Previous Medications    AMINO ACIDS (AMINO ACID PO)    Take 1 Tablet by mouth every day.    METHOCARBAMOL (ROBAXIN) 500 MG TAB    Take 1 Tablet by mouth 4 times a day as needed (pain) for up to 4 doses.    MULTIVITAMIN TAB    Take 3 Tablets by mouth every day.       ALLERGIES  Patient has no known allergies.    PHYSICAL EXAM  /81   Pulse 80   Temp 36.4 °C (97.5 °F) " "(Temporal)   Resp 18   Ht 1.778 m (5' 10\")   Wt 65.5 kg (144 lb 4.7 oz)   SpO2 95%         Constitutional: Nontoxic appearing. Alert in no apparent distress.  HENT: Normocephalic, Atraumatic. Bilateral external ears normal. Nose normal.  Moist mucous membranes.  Oropharynx clear.  Eyes: Pupils are equal and reactive. Conjunctiva normal.   Neck: Supple, full range of motion  Musculoskeletal: Atraumatic. No obvious deformities noted.  No lower extremity edema.  Skin: Warm, Dry.  No erythema, No rash.   Neurologic: Alert and oriented x3. Moving all extremities spontaneously without focal deficits.  Psychiatric: Odd affect with pressured speech and delusional thinking however appears overall appropriate.  He denies any suicidal or homicidal ideation.      COURSE & MEDICAL DECISION MAKING    4:16 PM - Patient seen and examined at bedside. Discussed plan of care moving forward. Patient had the opportunity to ask any questions. The plan for discharge was discussed with them and they were told to return for any new or worsening symptoms. He was also informed of the plans for follow up. Patient is understanding and agreeable to the plan for discharge.      ASSESSMENT, COURSE AND PLAN  Care Narrative: Patient with history of reported schizophrenia however also with methamphetamine abuse who presents with signs of psychosis and paranoid thinking.  He is afebrile with reassuring vital signs on arrival.  He provides me with a limited history however appears appropriate.  I did consider further labs and imaging however does not have any medical complaints or signs of trauma on exam.  He is hoping to go to [facility] for further evaluation.  He does not meet criteria for legal hold at this time as he is not suicidal or homicidal and he appears to be able to care for himself.  We will discharge him with transportation there to be evaluated voluntarily.        ADDITIONAL PROBLEM LIST  Problem #1: Acute psychosis " -possibly substance related however appears appropriate, will discharge for transfer to [facility] for voluntary assessment      DISPOSITION AND DISCUSSIONS      Discussion of management with other [facility] or appropriate source(s): Behavioral Health Low      Escalation of care considered, and ultimately not performed:Laboratory analysis and diagnostic imaging    Barriers to care at this time, including but not limited to: Patient does not have established PCP.       The patient will return for new or worsening symptoms and is stable at the time of discharge.    DISPOSITION:  Patient will be discharged home in stable condition.    FOLLOW UP:  [facility]  [address] [place] [id number] [phone]  Go to       [facility], Emergency Dept  [address] [id number] [phone]    If symptoms worsen      OUTPATIENT MEDICATIONS:  Discharge Medication List as of 6/23/2024  4:42 PM            FINAL DIAGNOSIS  1. Psychosis, unspecified psychosis type (HCC)        The note accurately reflects work and decisions made by me.  Haylee Lehman M.D.  6/23/2024  9:32 PM     Angélica GARCIA (Jeff), am scribing for, and in the presence of, Haylee Lehman M.D..    Electronically signed by: Angélica Harris (Jeff), 6/23/2024    Haylee GARCIA M.D. personally performed the services described in this documentation, as scribed by Angélica Harris in my presence, and it is both accurate and complete.

## 2024-06-23 NOTE — DISCHARGE PLANNING
Alert team note: Patient denies any SI/HI or thoughts of self harm. Reports history of schizopreinia and chronic AH, but refuses to take any medication. Patient appears at baseline and does not meet criteria for legal hold. Findings discussed with ERP and RN. Patient requesting to go to [facility]. Taxi voucher #[id number] given to RN.

## 2024-06-23 NOTE — ED NOTES
"Assist RN: Pt found in the corner of his room doing a hand stand.  Pt verbally aggressive. Pt requesting food and \"clippers.\"  This RN educated pt on brenda to remain NPO at this time.  No signs of learning at this time.    "

## 2024-06-23 NOTE — ED TRIAGE NOTES
"Chief Complaint   Patient presents with    Psych Eval     Pt bib ems for auditory and visual hallucination. Denies SI/HI. When asked for other complaints pt told RN that its \"classified\" and when asked if he has any complaints of pain, told RN \"NO COMMENT\".     Unable to obtain other information because everything is \"CLASSIFIED\" per pt.   Vitals:    06/23/24 1451   BP: 121/81   Pulse: 80   Resp: 18   Temp: 36.4 °C (97.5 °F)   SpO2: 95%       "

## 2024-06-24 VITALS
SYSTOLIC BLOOD PRESSURE: 128 MMHG | WEIGHT: 144 LBS | OXYGEN SATURATION: 95 % | HEART RATE: 71 BPM | TEMPERATURE: 98.2 F | DIASTOLIC BLOOD PRESSURE: 77 MMHG | HEIGHT: 70 IN | BODY MASS INDEX: 20.62 KG/M2 | RESPIRATION RATE: 17 BRPM

## 2024-06-24 NOTE — ED PROVIDER NOTES
"  ER Provider Note    Scribed for Monae Bales M.d. by Martha Gastelum. 6/23/2024  7:46 PM    Primary Care Provider: Pcp Pt States None    CHIEF COMPLAINT  Chief Complaint   Patient presents with    Legal 2000     Brought by EMS from [facility] on legal hold due to inability to take care for himself: Pt presents as disorganized and paranoid. Pt reports, \" I am ready to get violent, 800 people are following me\".     Hallucinations     LIMITATION TO HISTORY   Select: Behavior    HPI/ROS  OUTSIDE HISTORIAN(S):  None    EXTERNAL RECORDS REVIEWED  Care everywhere Patient has had several visits to the ED over the past few months. He was seen here earlier today and was placed on legal hold to be transferred to [facility].    Tomy Desai is a 58 y.o. male who presents to the ED for psychiatric evaluation. He was seen here earlier today. At that the patient noted that he had to be placed on a 72 hour hold to be transferred to [facility].  They tried to take him to [facility] and he did not have a bed there and therefore he is back.  He denies any alcohol or meth usage today.    PAST MEDICAL HISTORY  Past Medical History:   Diagnosis Date    Psychiatric disorder        SURGICAL HISTORY  Past Surgical History:   Procedure Laterality Date    NO PERTINENT PAST SURGICAL HISTORY         FAMILY HISTORY  History reviewed. No pertinent family history.    SOCIAL HISTORY   reports that he has been smoking. He has never used smokeless tobacco. He reports current alcohol use. He reports current drug use. Drug: Inhaled.    CURRENT MEDICATIONS  Previous Medications    AMINO ACIDS (AMINO ACID PO)    Take 1 Tablet by mouth every day.    METHOCARBAMOL (ROBAXIN) 500 MG TAB    Take 1 Tablet by mouth 4 times a day as needed (pain) for up to 4 doses.    MULTIVITAMIN TAB    Take 3 Tablets by mouth every day.       ALLERGIES  Patient has no known allergies.    PHYSICAL EXAM  /87   Pulse 65   Temp 36.8 °C (98.2 °F) (Temporal)   Resp 17   Ht 1.778 m (5' " "10\")   Wt 65.3 kg (144 lb)   SpO2 97%   BMI 20.66 kg/m²     Constitutional: Alert in no apparent distress.   HENT: Normocephalic, Atraumatic, Bilateral external ears normal. Nose normal.   Eyes:  Conjunctiva normal, non-icteric.   Lungs: Non-labored respirations  Skin: Warm, Dry, No erythema, No rash.   Neurologic: Alert, Grossly non-focal.   Psychiatric: Affect slightly agitated but cooperative  Extremities: Ambulatory no deficits      COURSE & MEDICAL DECISION MAKING    ED Observation Status? Yes; I am placing the patient in to an observation status due to a diagnostic uncertainty as well as therapeutic intensity. Patient placed in observation status at 7:51 PM, 6/23/2024.     Observation plan is as follows: Transfer to psychiatric facility when a bed is available      7:46 PM - Patient seen and evaluated at bedside. Patient will be treated with geodon for his symptoms. He understands and agrees to the plan of care.    INITIAL ASSESSMENT AND PLAN  Care Narrative: This is a 58-year-old gentleman who has underlying schizophrenia and substance abuse disorder who is really unable to care for himself at this time secondary to his psychosis.  He is well-known to behavioral health at this facility was seen earlier today.  He is on a legal hold and was supposed to go to [facility] but they did not have any beds.  Therefore he is here he will be continued on a legal hold and will be transferred to a psychiatric facility when he has a bed available.  He will be given some Geodon for sedation to help him get through the night.                     DISPOSITION AND DISCUSSIONS  I have discussed management of the patient with the following physicians and LILLIAM's: none    Discussion of management with other [facility] or appropriate source(s): Behavioral Health          Patient will be placed in ED observation and transfer to a psychiatric facility when a bed is available    FINAL IMPRESSION   1. Hallucinations    2. Psychosis, unspecified " psychosis type (HCC)        I, Martha Gastelum (Scribe), am scribing for, and in the presence of, Monae Bales M.D..    Electronically signed by: Martha Gastelum (Scribe), 6/23/2024    IMonae M.D. personally performed the services described in this documentation, as scribed by Martha Gastelum in my presence, and it is both accurate and complete.    The note accurately reflects work and decisions made by me.  Monae Bales M.D.  6/23/2024  9:34 PM

## 2024-06-24 NOTE — ED NOTES
Patient resting on gurney. Tele sitter in  room in direct view of patient . Patient up to restroom x 2 in last hour.

## 2024-06-24 NOTE — ED NOTES
Patient transferred to [facility] in stable condition with all belongings from locker 2. Report to [facility] paramedics. No distress noted. Patient ambulated to the ambulance bay with a steady gait.

## 2024-06-24 NOTE — ED NOTES
Med Rec complete per patient  Allergies reviewed with patient  Patient denies taking any RX or OTC meds in the last 30 days    Usha Lamas CPhT

## 2024-06-24 NOTE — ED NOTES
Bedside report received from off going RN/tech: Roshan, assumed care of patient.  POC discussed with patient. Call light within reach, all needs addressed at this time.       Fall risk interventions in place: Not Applicable (all applicable per [place] Fall risk assessment)   Continuous monitoring: Not Applicable   IVF/IV medications: Not Applicable   Oxygen: Room Air  Bedside sitter: Report given to sitter, checklist completed, and checklist completed, stop sign in doorway. Tele sitter Giancarlo called to update change of shift.    Isolation: Not Applicable

## 2024-06-24 NOTE — DISCHARGE PLANNING
Alert Team:    Patient is a 57 y/o male BB EMS from [facility] where he was placed on legal hold for inability to care for himself d/t his active delusions. Spoke with CECY Lopez at [facility] confirming they will be accepting patient for admission once bed available in the morning. Telesitter observation level implemented; no belongs, no phone/phone calls, no visitors until transferred back to [facility]. Legal hold certified and patient medically cleared. Breathalyzer negative and UDS pending.     Referral faxed to [facility]

## 2024-06-24 NOTE — ED NOTES
Patient resting on gurney. Tele sitter in  room in direct view of patient.Patient given juice and crackers.

## 2024-06-24 NOTE — ED NOTES
Checked on bed, with unlabored respirations. No safety risk noted  Sleeping  On Telesitter  with continuous visual monitoring by Trained Personnel  Continued safety precaution  Davonrdavid in low position, side rail up for pt safety.   No needs identified at the moment

## 2024-06-24 NOTE — DISCHARGE PLANNING
Legal Hold Transfer     Referral: Legal hold transfer to Mental Health Facility     Intervention: Transfer to [facility]     Pt's accepting physcian is Donovan     Transport arranged through [facility]     The pt will be picked up at 1300      Notified Bedside RN Zee CONTEH, Alert Team Marissa, and Dr. Salcedo of the departure time as well as accepting facility.      Transfer packet created with original legal hold and placed on chart.

## 2024-06-24 NOTE — ED NOTES
Bedside report received from CECY Koch. Assumed care of patient and he is resting. Bed locked and in lowest position. No call light at this time due to L2K. No oxygen requirements at this time. Appropriate fall precautions in place. Patient A&Ox4, GCS 15. Head and hands visible. Patient in paper scrubs. All equipment removed from the room, and safety precautions in place. Safety checklist in use and report passed on to the sitter. See MAR for medications and infusions. Tele sitter in direct line of sight. No distress noted.

## 2024-06-24 NOTE — ED TRIAGE NOTES
"Chief Complaint   Patient presents with    Legal 2000     Brought by EMS from [facility] on legal hold due to inability to take care for himself: Pt presents as disorganized and paranoid. Pt reports, \" I am ready to get violent, 800 people are following me\".     Hallucinations     Pain: 0/10    Pt is alert and oriented x 4, speaking in full sentences, follows commands and responds appropriately to questions.     Respirations are even and unlabored.    Vitals:    06/23/24 1956   BP: 136/87   Pulse: 65   Resp: 17   Temp: 36.8 °C (98.2 °F)   SpO2: 97%     "

## 2024-06-24 NOTE — ED NOTES
Checked on bed, with unlabored respirations. No safety risk noted  Awake on bed, given apple juice  On Telesitter with continuous visual monitoring by Trained Personnel  Continued safety precaution  Malik in low position, side rail up for pt safety.   No needs identified at the moment

## 2024-06-24 NOTE — ED NOTES
Patient spit juice on floor and hen threw his breakfast tray on floor. RN educated patient on use of trash can or alerting staff if he does not like a meal.

## 2024-06-24 NOTE — ED NOTES
Patient resting on gurney with equal rise and fall of chest. Tele sitter in view of patient in room.

## 2024-06-24 NOTE — DISCHARGE PLANNING
Legal Hold Transfer     Referral: Legal hold transfer to Mental Health Facility     Intervention: Notified by  Marissa that pt has been accepted to [facility].     Pt's accepting physcian is Dr. Donovan    [facility] Auth #     Transport arranged through [facility]     The pt will be picked up at 1300      Notified Bedside RN Zee, Alert Team RN Kay, and Dr. Salcedo of the departure time as well as accepting facility.      Transfer packet created with original legal hold and placed on chart by Alert Team RN.      Plan: Pt will be transferring to [facility] today at 1300 via [facility].

## 2024-06-24 NOTE — ED NOTES
Verified that Legal Hold is appropriate and signed in patient's chart.   All items removed from room for safety and to minimize risk of suicide.   Confirmed patient's personal items removed from room, and if applicable labeled/ placed in secure area  Stop sign filled up and placed in front of pt's room    Telesitter Observation. Continuous visual monitoring by Trained Personnel. Discussion with sitter about patient care, safety and support. Sitter has unobstructed view of patient at all times.

## 2024-06-24 NOTE — ED NOTES
Bedside report given to the next shift RN: Zee for continuity of care and management.  Provided opportunity to asks questions.    Contraptions: none  Alert and Oriented: x4  Ambulatory: yes  Oxygen: none  Pending: on legal hold with sitter pending transfer to behavioral health facility

## 2024-06-24 NOTE — ED NOTES
Patient's home medications have been reviewed by the pharmacy team. Patient seen for psychiatric evaluation. Patient was taken to [facility], but there was not a bed for him so he was brought back to ER. Plan is to transfer to a psychiatric facility when a bed is available .    Past Medical History:   Diagnosis Date    Psychiatric disorder        Patient's Medications   New Prescriptions    No medications on file   Previous Medications    No medications on file   Modified Medications    No medications on file   Discontinued Medications    AMINO ACIDS (AMINO ACID PO)    Take 1 Tablet by mouth every day.    METHOCARBAMOL (ROBAXIN) 500 MG TAB    Take 1 Tablet by mouth 4 times a day as needed (pain) for up to 4 doses.    MULTIVITAMIN TAB    Take 3 Tablets by mouth every day.          A:  Medications do not appear to be contributing to current complaints.         P:    No recommendations at this time. Patient denies taking any RX or OTC meds in the last 30 days.        Marlene Lugo, PharmD

## 2024-06-24 NOTE — DISCHARGE SUMMARY
"  ED Observation Discharge Summary    Patient:Tomy Desai  Patient : 1965  Patient MRN: [id number]  Patient PCP: Pcp Pt States None    Admit Date: 2024  Discharge Date and Time: 24 12:45 PM  Discharge Diagnosis:   1. Hallucinations    2. Psychosis, unspecified psychosis type (HCC)        Discharge Attending: Tay Salcedo M.D.  Discharge Service: ED Observation    ED Course  Tomy is a 58 y.o. male who was evaluated at [facility] emergency department for psychosis and hallucinations.  Patient has been evaluated.  Medically cleared.  Seen by the behavioral health team.  Applegate to be appropriate for inpatient psychiatric treatment.  He has been accepted for treatment at [facility].  The patient will be transferred at 1 PM today.  No acute issues or complaints.    Discharge Exam:  /77   Pulse 71   Temp 36.8 °C (98.2 °F) (Temporal)   Resp 17   Ht 1.778 m (5' 10\")   Wt 65.3 kg (144 lb)   SpO2 95%   BMI 20.66 kg/m² .    Constitutional: Awake and alert. Nontoxic  HENT:  Grossly normal  Eyes: Grossly normal  Neck: Normal range of motion  Cardiovascular: Normal heart rate   Thorax & Lungs: No respiratory distress  Abdomen: Nontender  Skin:  No pathologic rash.   Extremities: Well perfused  Psychiatric: Cooperative, odd affect    Labs  Results for orders placed or performed during the hospital encounter of 24   URINE DRUG SCREEN   Result Value Ref Range    Amphetamines Urine Negative Negative    Barbiturates Negative Negative    Benzodiazepines Negative Negative    Cocaine Metabolite Negative Negative    Fentanyl, Urine Negative Negative    Methadone Negative Negative    Opiates Negative Negative    Oxycodone Negative Negative    Phencyclidine -Pcp Negative Negative    Propoxyphene Negative Negative    Cannabinoid Metab Positive (A) Negative   POC BREATHALIZER   Result Value Ref Range    POC Breathalizer 0.000 0.00 - 0.01 Percent       Radiology  No orders to " display       Medications:   New Prescriptions    No medications on file       My final assessment includes   1. Hallucinations    2. Psychosis, unspecified psychosis type (HCC)        Upon Reevaluation, the patient's condition has: Remained stable, medically clear for inpatient psychiatric treatment    Patient discharged from ED Observation status at 12:46 PM  (Time) 6/24/2024  (Date).     Total time spent on this ED Observation discharge encounter is < 30 Minutes    Electronically signed by: Tay Salcedo M.D., 6/24/2024 12:45 PM

## 2024-08-15 ENCOUNTER — HOSPITAL ENCOUNTER (EMERGENCY)
Facility: MEDICAL CENTER | Age: 59
End: 2024-08-17
Attending: EMERGENCY MEDICINE
Payer: MEDICARE

## 2024-08-15 DIAGNOSIS — F29 PSYCHOSIS, UNSPECIFIED PSYCHOSIS TYPE (HCC): ICD-10-CM

## 2024-08-15 LAB — POC BREATHALIZER: 0 PERCENT (ref 0–0.01)

## 2024-08-15 PROCEDURE — 90791 PSYCH DIAGNOSTIC EVALUATION: CPT

## 2024-08-15 PROCEDURE — 302970 POC BREATHALIZER: Performed by: EMERGENCY MEDICINE

## 2024-08-15 PROCEDURE — 700102 HCHG RX REV CODE 250 W/ 637 OVERRIDE(OP): Performed by: EMERGENCY MEDICINE

## 2024-08-15 PROCEDURE — 99285 EMERGENCY DEPT VISIT HI MDM: CPT

## 2024-08-15 PROCEDURE — A9270 NON-COVERED ITEM OR SERVICE: HCPCS | Performed by: EMERGENCY MEDICINE

## 2024-08-15 RX ORDER — LORAZEPAM 1 MG/1
1 TABLET ORAL ONCE
Status: COMPLETED | OUTPATIENT
Start: 2024-08-15 | End: 2024-08-15

## 2024-08-15 RX ADMIN — LORAZEPAM 1 MG: 1 TABLET ORAL at 23:40

## 2024-08-15 ASSESSMENT — FIBROSIS 4 INDEX: FIB4 SCORE: 1.23

## 2024-08-16 LAB
AMPHET UR QL SCN: NEGATIVE
BARBITURATES UR QL SCN: NEGATIVE
BENZODIAZ UR QL SCN: POSITIVE
BZE UR QL SCN: NEGATIVE
CANNABINOIDS UR QL SCN: NEGATIVE
EKG IMPRESSION: NORMAL
FENTANYL UR QL: NEGATIVE
METHADONE UR QL SCN: NEGATIVE
OPIATES UR QL SCN: NEGATIVE
OXYCODONE UR QL SCN: NEGATIVE
PCP UR QL SCN: NEGATIVE
PROPOXYPH UR QL SCN: NEGATIVE

## 2024-08-16 PROCEDURE — 700102 HCHG RX REV CODE 250 W/ 637 OVERRIDE(OP): Mod: UD | Performed by: EMERGENCY MEDICINE

## 2024-08-16 PROCEDURE — 80307 DRUG TEST PRSMV CHEM ANLYZR: CPT

## 2024-08-16 PROCEDURE — A9270 NON-COVERED ITEM OR SERVICE: HCPCS | Mod: UD | Performed by: EMERGENCY MEDICINE

## 2024-08-16 PROCEDURE — 93005 ELECTROCARDIOGRAM TRACING: CPT | Performed by: EMERGENCY MEDICINE

## 2024-08-16 PROCEDURE — 96372 THER/PROPH/DIAG INJ SC/IM: CPT

## 2024-08-16 PROCEDURE — 700111 HCHG RX REV CODE 636 W/ 250 OVERRIDE (IP): Mod: JZ,UD | Performed by: EMERGENCY MEDICINE

## 2024-08-16 RX ORDER — OLANZAPINE 5 MG/1
5 TABLET ORAL EVERY EVENING
Status: DISCONTINUED | OUTPATIENT
Start: 2024-08-16 | End: 2024-08-17 | Stop reason: HOSPADM

## 2024-08-16 RX ORDER — HALOPERIDOL 5 MG/ML
5 INJECTION INTRAMUSCULAR ONCE
Status: COMPLETED | OUTPATIENT
Start: 2024-08-16 | End: 2024-08-16

## 2024-08-16 RX ORDER — LORAZEPAM 2 MG/ML
1 INJECTION INTRAMUSCULAR
Status: DISCONTINUED | OUTPATIENT
Start: 2024-08-16 | End: 2024-08-17 | Stop reason: HOSPADM

## 2024-08-16 RX ORDER — NICOTINE 21 MG/24HR
1 PATCH, TRANSDERMAL 24 HOURS TRANSDERMAL ONCE
Status: COMPLETED | OUTPATIENT
Start: 2024-08-16 | End: 2024-08-17

## 2024-08-16 RX ADMIN — OLANZAPINE 5 MG: 5 TABLET, FILM COATED ORAL at 16:46

## 2024-08-16 RX ADMIN — NICOTINE 14 MG: 14 PATCH TRANSDERMAL at 11:44

## 2024-08-16 RX ADMIN — HALOPERIDOL LACTATE 5 MG: 5 INJECTION, SOLUTION INTRAMUSCULAR at 16:37

## 2024-08-16 NOTE — ED NOTES
Fingernail clippers ordered due to patient request. Patient resting on gurney with 1 to 1 sitter in sight. Patient witnessed drinking urine from urinal. Dr. Davis notified.

## 2024-08-16 NOTE — DISCHARGE PLANNING
Alert Team/Behavioral Health   Note:      Patient found to have Medicaid FFS & [employer] Medicare. Referral sent to [facility] ([facility]), [facility] , and [facility]. Referral not sent to [facility] as they do not accept Medicaid or [employer] Medicare.      [facility] ALERT TEAM DISCHARGE PLANNING NOTE    Date: 8/16/24  Patient Name:  Tomy Desai - 58 y.o. - Discharge Planning  MRN:  [id number]   YOB: 1965  ADMISSION DATE:  8/15/2024     Writer forwarded referral packet for inpatient psychiatric care to the following community providers: [facility] ([facility]), [facility] , [facility]     Items included in the referral packet:   _x____Face Sheet   _x____Pages 1 and 2 of completed legal hold   _x____Alert Team/Psych Assessment   _x____H&P   _x____UDS   _x____Blood Alcohol   _x____Vital signs   _n/a____Pregnancy Test (if applicable)   _x____Medications List   _____Covid Screen

## 2024-08-16 NOTE — DISCHARGE PLANNING
Alert Team/Behavioral Health   Note:      - [facility]: Cannot accept [employer] Medicare as Medicare is always primary and Medicaid secondary.    - [facility]: Talked to Intake Counselor (Marni). Referral has been received and pending review. They will call back when they have an update.  @1420: Talked to  (Katelin). Patient declined due to aggressive behavior and danger to others.    - [facility] : @0940 Talked to Chadd. They will call back as they are about to start their daily morning meeting.  @1620: Talked to Chadd. No beds currently available and short staffing.
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
## 2024-08-16 NOTE — ED NOTES
The pt is alert and hyperverbal. The pt follows commands. The pt changed into paper scrubs. Belongings taken. Sitter in hallway ensuring the pt's safety

## 2024-08-16 NOTE — ED NOTES
Assist RN note: Pt medicated as ordered. Pt currently refusing to lay still for EKG. Agreeable to vital sign recheck.

## 2024-08-16 NOTE — ED NOTES
Pt ambulated to bathroom, no balance issues observed, successful and appropriate walking mobility.

## 2024-08-16 NOTE — ED NOTES
Checked on bed, with unlabored respirations. No safety risk noted  Sitter - 1:1 with continuous visual monitoring by Trained Personnel  Continued safety precaution  Gurney in low position, side rail up for pt safety.   No needs identified at the moment
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
## 2024-08-16 NOTE — PROGRESS NOTES
"ED Observation Progress Note    Scribed for Dr. David Davis M.D. by Elver Peterson. 8/16/2024  4:18 PM    Date of Service: 08/16/24    Interval History and Interventions  4:32 PM - Was informed that the patient was found in his room drinking his urine. I reevaluated him at this time and he requested several scans and medications. Ordered Haldol 5 mg injection, Zyprexa 5 mg oral, and Ativan 1 mg injection.    4:50 PM - I was informed that the patient was found throwing feces while in [facility], and infectious diseases wanted him to be blood tested.    Physical Exam  /81   Pulse 88   Temp 36.2 °C (97.2 °F) (Temporal)   Resp 16   Ht 1.753 m (5' 9\")   Wt 63.5 kg (140 lb)   SpO2 99%   BMI 20.67 kg/m² .    Constitutional: Awake and alert. Nontoxic  HENT:  Grossly normal  Eyes: Grossly normal  Neck: Normal range of motion  Cardiovascular: Normal heart rate   Thorax & Lungs: No respiratory distress  Abdomen: Nontender  Skin:  No pathologic rash.   Extremities: Well perfused  Psychiatric: Affect normal    Labs  Results for orders placed or performed during the hospital encounter of 08/15/24   URINE DRUG SCREEN   Result Value Ref Range    Amphetamines Urine Negative Negative    Barbiturates Negative Negative    Benzodiazepines Positive (A) Negative    Cocaine Metabolite Negative Negative    Fentanyl, Urine Negative Negative    Methadone Negative Negative    Opiates Negative Negative    Oxycodone Negative Negative    Phencyclidine -Pcp Negative Negative    Propoxyphene Negative Negative    Cannabinoid Metab Negative Negative   POC BREATHALIZER   Result Value Ref Range    POC Breathalizer 0.0 0.00 - 0.01 Percent       Radiology  No orders to display       Problem List  1. ***     Elver GARCIA (Jeff), am scribing for, and in the presence of, Dr. David Davis M.D..    Electronically signed by: Elver Peterson (Jeff), 8/16/2024    Dr. David GARCIA M.D. personally performed the services described " in this documentation, as scribed by Elver Peterson in my presence, and it is both accurate and complete.    {ERP Attestation (ERP ONLY):[id number]}

## 2024-08-16 NOTE — ED NOTES
Patient resting calmly on gurney.  Active chest rise noted.  1 to 1 sitter in direct view of patient.

## 2024-08-16 NOTE — ED NOTES
The pt is laying in bed with eyes closed. Breathing is even and unlabored. Sitter in hallway ensuring the pt's safety

## 2024-08-16 NOTE — ED NOTES
Med rec completed per patient at bedside. Patient denies using any prescription medications at this time. No recent over-the-counter medications.    Allergies reviewed with patient.    Outpatient antibiotics within the last 30 days: none.    ANTICOAGULANTS: none.

## 2024-08-16 NOTE — ED NOTES
The pt is laying in bed with eyes closed. Breathing is even and unlabored. The pt arousable to voice and reports more paranoid delusions. Sitter in hallway ensuring the pt's safety. Food given. The pt follows commands to take meds. Vitals stable
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
## 2024-08-16 NOTE — ED NOTES
Patient's home medications have been reviewed by the pharmacy team.     Past Medical History:   Diagnosis Date    Psychiatric disorder        Patient's Medications    No medications on file          A:  Denies taking any home medications.     P:    No recommendations at this time. Defer to psychiatry.     Verito Mullins, Pharm.D., BCPS

## 2024-08-16 NOTE — ED NOTES
Bedside report received from CECY Werner. Assumed care of patient. Patient resting calmly on gurney with 1 to 1 sitter in view of patient.

## 2024-08-16 NOTE — ED NOTES
Checked on bed, with unlabored respirations. No safety risk noted  Sleeping  Sitter - 1:1 with continuous visual monitoring by Trained Personnel  Continued safety precaution  Davonrdavid in low position, side rail up for pt safety.   No needs identified at the moment     Bedside report from [facility]

## 2024-08-16 NOTE — ED NOTES
The pt ambulatory to the room with a steady gait. The pt is alert and hyperverbal. The pt follows commands.
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
## 2024-08-16 NOTE — ED NOTES
Bedside report received from prior RN. Pt resting. Resp normal/unlabored on RA. Bed side rails up/in low position.    All items removed from room for safety and to minimize risk. Verified that Legal Hold appropriate and signed in patient's chart. Confirmed patient's personal items removed from room, and if applicable labeled/placed in secure area.        1:1 Observation. Continuous visual monitoring by Trained Personnel. Sitter has unobstructed view of patient at all times. Discussion with sitter about patient care, safety, and support.

## 2024-08-16 NOTE — ED NOTES
Checked on bed, with unlabored respirations. No safety risk noted  Sitter - 1:1 with continuous visual monitoring by Trained Personnel  Continued safety precaution  Gurney in low position, side rail up for pt safety.   No needs identified at the moment       Food provided

## 2024-08-17 ENCOUNTER — PHARMACY VISIT (OUTPATIENT)
Dept: PHARMACY | Facility: MEDICAL CENTER | Age: 59
End: 2024-08-17
Payer: COMMERCIAL

## 2024-08-17 VITALS
SYSTOLIC BLOOD PRESSURE: 133 MMHG | HEART RATE: 89 BPM | TEMPERATURE: 97 F | OXYGEN SATURATION: 95 % | BODY MASS INDEX: 20.73 KG/M2 | WEIGHT: 140 LBS | RESPIRATION RATE: 18 BRPM | HEIGHT: 69 IN | DIASTOLIC BLOOD PRESSURE: 78 MMHG

## 2024-08-17 RX ORDER — OLANZAPINE 10 MG/1
10 TABLET ORAL NIGHTLY
Qty: 30 TABLET | Refills: 0 | Status: SHIPPED | OUTPATIENT
Start: 2024-08-17

## 2024-08-17 NOTE — ED NOTES
Patient resting calmly on gurney.  Active chest rise noted.  1 to 1 sitter in direct view of patient. Report to new sitter.   Ambulatory

## 2024-08-17 NOTE — ED NOTES
Checked on bed, with unlabored respirations. No safety risk noted  Sleeping  Sitter - 1:1 with continuous visual monitoring by Trained Personnel  Continued safety precaution  Gurney in low position, side rail up for pt safety.   No needs identified at the moment       Bedside report from Thu SAM
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
## 2024-08-17 NOTE — ED NOTES
Legal hold d/c belongings given  Nu went to get pt's medications.   Pt discharge Pt given discharge instructions and prescription. Pt refused to sign d/c paper. Ambulating steady.

## 2024-08-17 NOTE — ED NOTES
Bedside report given to CECY Andino. Patient care transferred. 1 to 1 sitter within view of patient.

## 2024-08-17 NOTE — ED NOTES
Patient used fingernail clippers with supervision of 1 to 1 sitter. Fingernail clippers removed from room and placed in a bag in the patient chart. Sitter in view of patient.

## 2024-08-17 NOTE — DISCHARGE SUMMARY
"  ED Observation Discharge Summary    Patient:Tomy Desai  Patient : 1965  Patient MRN: [id number]  Patient PCP: Pcp Pt States None    Admit Date: 8/15/2024  Discharge Date and Time: 24 8:02 AM  Discharge Diagnosis: Schizophrenia, homelessness  Discharge Attending: Frantz Hanna M.D.  Discharge Service: ED Observation    ED Course  Tomy is a 58 y.o. male who was evaluated at [facility] for hallucinations, combative behavior.  The patient has been the Emergency Department for 36 hours, after discussion with Nu from life skills as well as nursing, the patient has been much more cooperative overnight.  He has been refused previously from all psychiatric facilities in the local area due to his recurrent issues with being combative.  Therefore there is no benefit to keeping the patient here in the ER once he is more cooperative.    He has a long history of combative behavior, throwing feces etc. per Nu.  He was on a 30-day band from the [facility] for a similar issue.  He was incarcerated for the past 30 days for once again violent behavior, therefore his 30-day banned from the [facility] has  and he can now go back.  I will refill his Zyprexa prescription.    Discharge Exam:  /78   Pulse 88   Temp 36.3 °C (97.4 °F) (Temporal)   Resp 16   Ht 1.753 m (5' 9\")   Wt 63.5 kg (140 lb)   SpO2 99%   BMI 20.67 kg/m² .    Constitutional: Awake and alert. Nontoxic  HENT:  Grossly normal  Eyes: Grossly normal  Neck: Normal range of motion  Cardiovascular: Normal heart rate   Thorax & Lungs: No respiratory distress  Abdomen: Nontender  Skin:  No pathologic rash.   Extremities: Well perfused  Psychiatric: Affect normal    Labs  Results for orders placed or performed during the hospital encounter of 08/15/24   URINE DRUG SCREEN   Result Value Ref Range    Amphetamines Urine Negative Negative    Barbiturates Negative Negative    Benzodiazepines Positive (A) Negative    Cocaine Metabolite " Negative Negative    Fentanyl, Urine Negative Negative    Methadone Negative Negative    Opiates Negative Negative    Oxycodone Negative Negative    Phencyclidine -Pcp Negative Negative    Propoxyphene Negative Negative    Cannabinoid Metab Negative Negative   POC BREATHALIZER   Result Value Ref Range    POC Breathalizer 0.0 0.00 - 0.01 Percent   EKG   Result Value Ref Range    Report       [facility] Emergency Dept.    Test Date:  2024  Pt Name:    DENISE CHAPA                Department: ER  MRN:        [id number]                      Room:       [facility]  Gender:     Male                         Technician:  :        1965                   Requested By:BEATRICE SMALLS  Order #:    [id number]                    Reading MD:    Measurements  Intervals                                Axis  Rate:       82                           P:          60  DE:         131                          QRS:        -55  QRSD:       77                           T:          61  QT:         330  QTc:        386    Interpretive Statements  Sinus rhythm  Left anterior fascicular block  Abnormal R-wave progression, early transition  Abnormal T, consider ischemia, anterior leads  No previous ECG available for comparison         Radiology  No orders to display       Medications:   New Prescriptions    OLANZAPINE (ZYPREXA) 10 MG TABLET    Take 1 Tablet by mouth every evening.       My final assessment includes schizophrenia  Upon Reevaluation, the patient's condition has: Improved; and will be discharged.    Patient discharged from ED Observation status at 2024 8:05 AM     Total time spent on this ED Observation discharge encounter is < 30 Minutes

## 2024-08-17 NOTE — ED NOTES
Bedside report received from off going RN/tech: April rn, assumed care of patient.  POC discussed with patient. Call light within reach, all needs addressed at this time.       Fall risk interventions in place: Not Applicable (all applicable per [employer] Fall risk assessment)   Continuous monitoring: Not Applicable   IVF/IV medications: Not Applicable   Oxygen: Room Air  Bedside sitter: Pt on L2k SI with 1:1 sitter Rayo (name)  Isolation: Not Applicable

## 2024-08-17 NOTE — ED NOTES
Fingernail clippers provided and used by patient with supervision. Clippers removed from room and placed in bag in patient chart. 1 to 1 sitter within view of patient.

## 2024-08-17 NOTE — ED NOTES
Bedside report received from off going RN: Simba, assumed care of patient.  POC discussed with patient. all needs addressed at this time.       Fall risk interventions in place: Keep floor surfaces clean and dry (all applicable per [place] Fall risk assessment)   Continuous monitoring: Not Applicable   IVF/IV medications: Not Applicable   Oxygen: Room Air  Bedside sitter: Pt on L2k SI with 1:1 sitter . (name), Report given to sitter, checklist completed, and checklist completed, stop sign in doorway  Isolation: Not Applicable

## 2024-08-17 NOTE — PROGRESS NOTES
"ED Observation Progress Note    Date of Service: 08/17/24    Interval History and Interventions  Patient is in ED observation status at the time of this dictation, has been residing in the ER for over 34 hours.  He was brought in for bizarre behavior, history of polysubstance abuse but drug screen on this hospitalization only positive for benzodiazepines.  He has required sedation several times over the past 34 hours, none overnight.    On legal hold for inability care for self, while in the ER has been observed drinking urine, throwing feces and making bizarre statements, particularly paranoid.  Continuing on legal hold status for psychiatric placement.    Physical Exam  /78   Pulse 88   Temp 36.3 °C (97.4 °F) (Temporal)   Resp 16   Ht 1.753 m (5' 9\")   Wt 63.5 kg (140 lb)   SpO2 99%   BMI 20.67 kg/m² .    Constitutional: Awake and alert. Nontoxic  HENT:  Grossly normal  Eyes: Grossly normal  Neck: Normal range of motion  Cardiovascular: Normal heart rate   Thorax & Lungs: No respiratory distress  Abdomen: Nontender  Skin:  No pathologic rash.   Extremities: Well perfused  Psychiatric: Affect normal    Labs  Results for orders placed or performed during the hospital encounter of 08/15/24   URINE DRUG SCREEN   Result Value Ref Range    Amphetamines Urine Negative Negative    Barbiturates Negative Negative    Benzodiazepines Positive (A) Negative    Cocaine Metabolite Negative Negative    Fentanyl, Urine Negative Negative    Methadone Negative Negative    Opiates Negative Negative    Oxycodone Negative Negative    Phencyclidine -Pcp Negative Negative    Propoxyphene Negative Negative    Cannabinoid Metab Negative Negative   POC BREATHALIZER   Result Value Ref Range    POC Breathalizer 0.0 0.00 - 0.01 Percent   EKG   Result Value Ref Range    Report       [facility] Emergency Dept.    Test Date:  2024-08-16  Pt Name:    DENISE CHAPA                Department: ER  MRN:        [id number]   "                    Room:       [facility]  Gender:     Male                         Technician:  :        1965                   Requested By:BEATRICE SMALLS  Order #:    [id number]                    Reading MD:    Measurements  Intervals                                Axis  Rate:       82                           P:          60  IL:         131                          QRS:        -55  QRSD:       77                           T:          61  QT:         330  QTc:        386    Interpretive Statements  Sinus rhythm  Left anterior fascicular block  Abnormal R-wave progression, early transition  Abnormal T, consider ischemia, anterior leads  No previous ECG available for comparison         Radiology  No orders to display       Problem List  1.  Psychosis    Electronically signed by: Frantz Hanna M.D., 2024 5:59 AM

## 2024-12-16 ENCOUNTER — APPOINTMENT (OUTPATIENT)
Dept: RADIOLOGY | Facility: MEDICAL CENTER | Age: 59
End: 2024-12-16
Attending: EMERGENCY MEDICINE
Payer: MEDICARE

## 2024-12-16 ENCOUNTER — HOSPITAL ENCOUNTER (EMERGENCY)
Facility: MEDICAL CENTER | Age: 59
End: 2024-12-17
Attending: EMERGENCY MEDICINE
Payer: MEDICARE

## 2024-12-16 DIAGNOSIS — R45.851 SUICIDAL IDEATION: ICD-10-CM

## 2024-12-16 DIAGNOSIS — F23 ACUTE PSYCHOSIS (HCC): ICD-10-CM

## 2024-12-16 DIAGNOSIS — S90.32XA CONTUSION OF FOOT OR HEEL, LEFT, INITIAL ENCOUNTER: ICD-10-CM

## 2024-12-16 LAB
ALBUMIN SERPL BCP-MCNC: 3.8 G/DL (ref 3.2–4.9)
ALBUMIN/GLOB SERPL: 1.4 G/DL
ALP SERPL-CCNC: 77 U/L (ref 30–99)
ALT SERPL-CCNC: 24 U/L (ref 2–50)
AMPHET UR QL SCN: NEGATIVE
ANION GAP SERPL CALC-SCNC: 9 MMOL/L (ref 7–16)
AST SERPL-CCNC: 36 U/L (ref 12–45)
BARBITURATES UR QL SCN: NEGATIVE
BASOPHILS # BLD AUTO: 0.2 % (ref 0–1.8)
BASOPHILS # BLD: 0.02 K/UL (ref 0–0.12)
BENZODIAZ UR QL SCN: NEGATIVE
BILIRUB SERPL-MCNC: 0.3 MG/DL (ref 0.1–1.5)
BUN SERPL-MCNC: 20 MG/DL (ref 8–22)
BZE UR QL SCN: NEGATIVE
CALCIUM ALBUM COR SERPL-MCNC: 9.2 MG/DL (ref 8.5–10.5)
CALCIUM SERPL-MCNC: 9 MG/DL (ref 8.5–10.5)
CANNABINOIDS UR QL SCN: POSITIVE
CHLORIDE SERPL-SCNC: 105 MMOL/L (ref 96–112)
CO2 SERPL-SCNC: 28 MMOL/L (ref 20–33)
CREAT SERPL-MCNC: 0.91 MG/DL (ref 0.5–1.4)
EOSINOPHIL # BLD AUTO: 0.08 K/UL (ref 0–0.51)
EOSINOPHIL NFR BLD: 0.8 % (ref 0–6.9)
ERYTHROCYTE [DISTWIDTH] IN BLOOD BY AUTOMATED COUNT: 44.3 FL (ref 35.9–50)
FENTANYL UR QL: NEGATIVE
GFR SERPLBLD CREATININE-BSD FMLA CKD-EPI: 96 ML/MIN/1.73 M 2
GLOBULIN SER CALC-MCNC: 2.7 G/DL (ref 1.9–3.5)
GLUCOSE SERPL-MCNC: 101 MG/DL (ref 65–99)
HCT VFR BLD AUTO: 40.3 % (ref 42–52)
HGB BLD-MCNC: 13.5 G/DL (ref 14–18)
IMM GRANULOCYTES # BLD AUTO: 0.03 K/UL (ref 0–0.11)
IMM GRANULOCYTES NFR BLD AUTO: 0.3 % (ref 0–0.9)
LYMPHOCYTES # BLD AUTO: 1.95 K/UL (ref 1–4.8)
LYMPHOCYTES NFR BLD: 19.4 % (ref 22–41)
MCH RBC QN AUTO: 31.4 PG (ref 27–33)
MCHC RBC AUTO-ENTMCNC: 33.5 G/DL (ref 32.3–36.5)
MCV RBC AUTO: 93.7 FL (ref 81.4–97.8)
METHADONE UR QL SCN: NEGATIVE
MONOCYTES # BLD AUTO: 0.97 K/UL (ref 0–0.85)
MONOCYTES NFR BLD AUTO: 9.7 % (ref 0–13.4)
NEUTROPHILS # BLD AUTO: 6.99 K/UL (ref 1.82–7.42)
NEUTROPHILS NFR BLD: 69.6 % (ref 44–72)
NRBC # BLD AUTO: 0 K/UL
NRBC BLD-RTO: 0 /100 WBC (ref 0–0.2)
OPIATES UR QL SCN: NEGATIVE
OXYCODONE UR QL SCN: NEGATIVE
PCP UR QL SCN: NEGATIVE
PLATELET # BLD AUTO: 300 K/UL (ref 164–446)
PMV BLD AUTO: 9.7 FL (ref 9–12.9)
POC BREATHALIZER: 0 PERCENT (ref 0–0.01)
POTASSIUM SERPL-SCNC: 4.3 MMOL/L (ref 3.6–5.5)
PROPOXYPH UR QL SCN: NEGATIVE
PROT SERPL-MCNC: 6.5 G/DL (ref 6–8.2)
RBC # BLD AUTO: 4.3 M/UL (ref 4.7–6.1)
SODIUM SERPL-SCNC: 142 MMOL/L (ref 135–145)
WBC # BLD AUTO: 10 K/UL (ref 4.8–10.8)

## 2024-12-16 PROCEDURE — 90791 PSYCH DIAGNOSTIC EVALUATION: CPT

## 2024-12-16 PROCEDURE — 80307 DRUG TEST PRSMV CHEM ANLYZR: CPT

## 2024-12-16 PROCEDURE — 85025 COMPLETE CBC W/AUTO DIFF WBC: CPT

## 2024-12-16 PROCEDURE — 80053 COMPREHEN METABOLIC PANEL: CPT

## 2024-12-16 PROCEDURE — 302970 POC BREATHALIZER

## 2024-12-16 PROCEDURE — 73630 X-RAY EXAM OF FOOT: CPT | Mod: LT

## 2024-12-16 PROCEDURE — 36415 COLL VENOUS BLD VENIPUNCTURE: CPT

## 2024-12-16 PROCEDURE — 99285 EMERGENCY DEPT VISIT HI MDM: CPT

## 2024-12-16 PROCEDURE — 302970 POC BREATHALIZER: Performed by: EMERGENCY MEDICINE

## 2024-12-16 PROCEDURE — 73650 X-RAY EXAM OF HEEL: CPT | Mod: LT

## 2024-12-16 ASSESSMENT — LIFESTYLE VARIABLES
HAVE YOU EVER FELT YOU SHOULD CUT DOWN ON YOUR DRINKING: NO
TOTAL SCORE: 0
TOTAL SCORE: 0
DOES PATIENT WANT TO STOP DRINKING: NO
EVER HAD A DRINK FIRST THING IN THE MORNING TO STEADY YOUR NERVES TO GET RID OF A HANGOVER: NO
CONSUMPTION TOTAL: INCOMPLETE
HAVE PEOPLE ANNOYED YOU BY CRITICIZING YOUR DRINKING: NO
TOTAL SCORE: 0
EVER FELT BAD OR GUILTY ABOUT YOUR DRINKING: NO
DO YOU DRINK ALCOHOL: YES

## 2024-12-16 ASSESSMENT — FIBROSIS 4 INDEX: FIB4 SCORE: 1.25

## 2024-12-17 VITALS
WEIGHT: 160 LBS | TEMPERATURE: 98.9 F | BODY MASS INDEX: 23.7 KG/M2 | SYSTOLIC BLOOD PRESSURE: 138 MMHG | OXYGEN SATURATION: 96 % | RESPIRATION RATE: 17 BRPM | DIASTOLIC BLOOD PRESSURE: 77 MMHG | HEART RATE: 84 BPM | HEIGHT: 69 IN

## 2024-12-17 RX ORDER — OLANZAPINE 5 MG/1
5 TABLET, ORALLY DISINTEGRATING ORAL ONCE
Status: DISCONTINUED | OUTPATIENT
Start: 2024-12-17 | End: 2024-12-17 | Stop reason: HOSPADM

## 2024-12-17 RX ORDER — DROPERIDOL 2.5 MG/ML
5 INJECTION, SOLUTION INTRAMUSCULAR; INTRAVENOUS ONCE
Status: DISCONTINUED | OUTPATIENT
Start: 2024-12-17 | End: 2024-12-17 | Stop reason: HOSPADM

## 2024-12-17 NOTE — ED NOTES
Report from Y[facility] SAM    Checked on bed, with unlabored respirations. No safety risk noted  Pt sitting on  the chair  Sitter - 1:1 direct view of the pt  Continued safety precaution  Malik in low position, side rail up for pt safety.   No needs identified at the moment

## 2024-12-17 NOTE — ED NOTES
Pt ambulatory to restroom with steady gait without new incident or distress. 1:1 sitter continued.

## 2024-12-17 NOTE — ED NOTES
Bedside report received from off going RN/tech: CECY Kim assumed care of patient.  POC discussed with patient. all needs addressed at this time.       Fall risk interventions in place: Keep floor surfaces clean and dry (all applicable per [place] Fall risk assessment)   Continuous monitoring: Not Applicable   IVF/IV medications: Not Applicable   Oxygen: Room Air  Bedside sitter: Pt on L2k SI with 1:1 sitter   (name)  Isolation: Not Applicable

## 2024-12-17 NOTE — ED NOTES
Checked on bed, with unlabored respirations. No safety risk noted  Sleeping  Sitter - 1:1 with continuous visual monitoring by Trained Personnel  Continued safety precaution  Davonrdavid in low position, side rail up for pt safety.   No needs identified at the moment     Consultant

## 2024-12-17 NOTE — ED NOTES
[facility] transfer crew at bedside.  Per EMS, Pt refuses to go to [facility], throws his sandwich down on the floor.

## 2024-12-17 NOTE — ED NOTES
Alert Team said pt will be going to [facility] at 0900, will be  by [facility] at 0900
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
## 2024-12-17 NOTE — ED NOTES
Pt sleeping in bed, equal chest rise and fall, airway patent, rr even and unlabored, NAD noted. 1:1 sitter within line of sight.

## 2024-12-17 NOTE — ED NOTES
Pt cooperative, ambulates(steady gait) to the ambulance bay, [facility] and [facility] officers.  Pt to be transferred to [facility].

## 2024-12-17 NOTE — DISCHARGE PLANNING
Alert Team:  Kelsie from [facility] called and reported there are no male beds available tonight, but the facility should be able to accept the patient later today 12/17/24.

## 2024-12-17 NOTE — ED NOTES
Alert team and [facility] transfer crew at bedside.  Pt upset, but agrees to be transferred to [facility].

## 2024-12-17 NOTE — ED NOTES
Pt sitting upright on gurney, consuming breakfast tray.  Pt requests coffee and turkey sandwiches.  Sitter outside of room with direct view of Pt.

## 2024-12-17 NOTE — ED NOTES
Medication available from pharmacy, pt refused to take it and verbalized he will stop screaming and pacing once this RN give him a sandwich. Erp made aware    Provided a sandwich and juice.     Sitter 1:1 remains outside the room in full view of the pt

## 2024-12-17 NOTE — DISCHARGE PLANNING
[facility] ED Behavioral Health Fax Referral      Referral: Legal Hold    Intervention: Patient referral to community inpatient  facillity    Legal Hold Initiated: Date: 12/16/24 Time: 1725    Patient’s Insurance Listed on Face Sheet: Medicaid FFS, Medicare    Referrals sent to:  [facility], [facility] , [facility] , [facility]    Referrals faxed by Sukhdev Duff RN, MBA    This referral contains the following information:  Face sheet ___x_  Page 1 and Page 2 of Legal Hold __x__  Alert Team Assessment/Psych Assessment ___x_  Head to toe physical exam ____  Urine Drug Screen __x__  Blood Alcohol __x__  Vital signs _x___  Pregnancy test when applicable ___  Medications list _x___  Covid screening ____    Plan: Patient will transfer to mental health facility once acceptance is obtained    For all referral information, please contact the  referral office daily between the hours of 7:00 a.m. to 6:00 p.m. at:   Phone [phone]   Fax [phone]    ED  Alert Team   Phone [phone] Fax [phone]    [facility] Emergency Department Contact numbers:  Green Pod [phone]   Blue Pod [phone]   Red Pod [phone]   Pediatrics [phone]
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
## 2024-12-17 NOTE — ED NOTES
Pt woke up, screaming and peeing on the wall; pt pacing inside he room then goes back to his bed. Informed Erp

## 2024-12-17 NOTE — DISCHARGE PLANNING
Alert Team:  Mario Alberto from [facility] called, patient accepted to this facility with accepting provider Girish for transfer at 0900     PCS form completed and faxed to [facility] verified by Arie paperwork was received.

## 2024-12-17 NOTE — ED NOTES
Patient's home medications have been reviewed by the pharmacy team.     Past Medical History:   Diagnosis Date    Psychiatric disorder        Patient's Medications   New Prescriptions    No medications on file   Previous Medications    No medications on file   Modified Medications    No medications on file   Discontinued Medications    OLANZAPINE (ZYPREXA) 10 MG TABLET    Take 1 Tablet by mouth every evening.          A:  Medications do not appear to be contributing to current complaints.     P:    No recommendations at this time. Home medications have been reordered as appropriate.    Carter Barth, PharmD

## 2024-12-17 NOTE — ED NOTES
Checked on bed, with unlabored respirations. No safety risk noted  Pt woke up  Sitter - 1:1 with continuous visual monitoring by Trained Personnel  Continued safety precaution  Davonrdavid in low position, side rail up for pt safety.   No needs identified at the moment

## 2024-12-17 NOTE — DISCHARGE PLANNING
Alert Team Note     Contacted Rosemarie at [facility], was advised still no beds at this time, at the moment they only have one female bed available. Referral is pending. Will be notified when beds become available.

## 2024-12-17 NOTE — ED NOTES
"Medication history reviewed with patient at bedside.   Med rec is complete    Allergies reviewed- Patient states that he is \"Allergic to all Medications, except for Demerol\".    Patient has not had any outpatient antibiotics in the last 30 days.   Anticoagulants: No    Ruth Ibrahim          "

## 2024-12-17 NOTE — ED PROVIDER NOTES
"ER Provider Note    Scribed for Mell Coker M.d. by Triny Maurer. 12/16/2024  6:54 PM    Primary Care Provider: Pcp Pt States None    CHIEF COMPLAINT   Chief Complaint   Patient presents with    Psych Eval    Suicidal Ideation     EXTERNAL RECORDS REVIEWED  The patient was at [facility] in August of 2024 for psychiatric evaluation. The patient was seen by psych at that time; was on a legal hold. He was agitated and aggressive. He was unable to care for self. After reevaluation by psych, hold was discontinued. He was seen here the next day and he was put back on a legal hold as he was observed drinking urine, throwing feces and making paranoia bizarre comments here in the ER.     HPI/ROS  LIMITATION TO HISTORY   Select: : None  OUTSIDE HISTORIAN(S):  None.    Tomy Desai is a 59 y.o. male who presents to the ED via EMS on a legal hold from [facility] triage. Per report, the patient presented there with complaints of suicidal ideation and paranoid thoughts, such as that the MICHELLE is trying to kill him, cult practicing, people chasing him trying to kill him. Per nursing note, the patient does endorse SI on arrival to the emergency department. The patient did not state a clear plan to the nurse, but stated that he has one. On my examination, he describes that he has thought of harming himself. He states that he has the \"eugenics plan\" but he does not have control of this plan, but notes that this plan will also kill thousands of other people as well. The patient states he also has nasal congestion, but denies any auditory or visual hallucinations. The patient also reports that he has a broken heel after jumping off the walls yesterday. He states that he jumped off a wall and bruised his heal. He is walking on that foot. The patient states that he thinks he broke his heel and he is requesting imaging.  He says he has chronic back pain, but nothing new or " "different.  The patient reports that he smokes marijuana. He also states that he \"tests\" methamphetamine from the streets. The patient notes that he is able to tell whether the meth is laced with another drug, such as fentanyl or ketamine. He states that he has history OCD, depression and anxiety. The patient states that he takes herbal and holistic medications for these diagnoses, but refuses to tell ER MD with those herbal or holistic remedies are.    PAST MEDICAL HISTORY  Past Medical History:   Diagnosis Date    Psychiatric disorder        SURGICAL HISTORY  Past Surgical History:   Procedure Laterality Date    NO PERTINENT PAST SURGICAL HISTORY         FAMILY HISTORY  No family history pertinent.      SOCIAL HISTORY   reports that he has been smoking cigarettes. He has never used smokeless tobacco. He reports current alcohol use. He reports current drug use. Drug: Inhaled.      CURRENT MEDICATIONS  No current outpatient medications     ALLERGIES  Patient has no known allergies.      PHYSICAL EXAM  BP (!) 152/75   Pulse 96   Temp 36.5 °C (97.7 °F) (Temporal)   Resp 18   Ht 1.753 m (5' 9\")   Wt 72.6 kg (160 lb)   SpO2 97%   BMI 23.63 kg/m²     Constitutional: Fidgety and restless; No acute distress; Eating a sandwich on ER MD arrival. Non-toxic appearance.   HENT: Normocephalic, atraumatic; Bilateral external ears normal; Oropharynx with slightly dry mucous membranes;  Eyes: PERRL, EOMI, Conjunctiva normal. No discharge.   Neck:  Supple, nontender midline; No stridor; No nuchal rigidity.   Lymphatic: No cervical lymphadenopathy noted.   Cardiovascular: Mildly tachycardic rate, but regular rhythm without murmurs, rubs, or gallop.   Thorax & Lungs: No respiratory distress, breath sounds clear to auscultation bilaterally without wheezing, rales or rhonchi. Nontender chest wall. No crepitus or subcutaneous air  Abdomen: Soft, nontender, bowel sounds normal. No obvious masses; No pulsatile masses; no rebound, " guarding, or peritoneal signs.   Skin: Good color; warm and dry without rash or petechia.  Back: Nontender T-spine and L-spine, No CVA tenderness.   Extremities: Distal radial, dorsalis pedis, posterior tibial pulses are equal bilaterally; No edema; Nontender calves or saphenous, No cyanosis, No clubbing. Left heel has some ecchymosis. Wouldn't really allow me to touch his foot, just would show me because he thinks it is broken.   Musculoskeletal: Good range of motion in all major joints. No tenderness to palpation or major deformities noted.   Neurologic: Alert & oriented x 4, clear speech      DIAGNOSTIC STUDIES    LABS  Results for orders placed or performed during the hospital encounter of 12/16/24   POC BREATHALIZER    Collection Time: 12/16/24  6:29 PM   Result Value Ref Range    POC Breathalizer 0.00 0.00 - 0.01 Percent   Urine Drug Screen    Collection Time: 12/16/24  7:06 PM   Result Value Ref Range    Amphetamines Urine Negative Negative    Barbiturates Negative Negative    Benzodiazepines Negative Negative    Cocaine Metabolite Negative Negative    Fentanyl, Urine Negative Negative    Methadone Negative Negative    Opiates Negative Negative    Oxycodone Negative Negative    Phencyclidine -Pcp Negative Negative    Propoxyphene Negative Negative    Cannabinoid Metab Positive (A) Negative   CBC WITH DIFFERENTIAL    Collection Time: 12/16/24  7:56 PM   Result Value Ref Range    WBC 10.0 4.8 - 10.8 K/uL    RBC 4.30 (L) 4.70 - 6.10 M/uL    Hemoglobin 13.5 (L) 14.0 - 18.0 g/dL    Hematocrit 40.3 (L) 42.0 - 52.0 %    MCV 93.7 81.4 - 97.8 fL    MCH 31.4 27.0 - 33.0 pg    MCHC 33.5 32.3 - 36.5 g/dL    RDW 44.3 35.9 - 50.0 fL    Platelet Count 300 164 - 446 K/uL    MPV 9.7 9.0 - 12.9 fL    Neutrophils-Polys 69.60 44.00 - 72.00 %    Lymphocytes 19.40 (L) 22.00 - 41.00 %    Monocytes 9.70 0.00 - 13.40 %    Eosinophils 0.80 0.00 - 6.90 %    Basophils 0.20 0.00 - 1.80 %    Immature Granulocytes 0.30 0.00 - 0.90 %     Nucleated RBC 0.00 0.00 - 0.20 /100 WBC    Neutrophils (Absolute) 6.99 1.82 - 7.42 K/uL    Lymphs (Absolute) 1.95 1.00 - 4.80 K/uL    Monos (Absolute) 0.97 (H) 0.00 - 0.85 K/uL    Eos (Absolute) 0.08 0.00 - 0.51 K/uL    Baso (Absolute) 0.02 0.00 - 0.12 K/uL    Immature Granulocytes (abs) 0.03 0.00 - 0.11 K/uL    NRBC (Absolute) 0.00 K/uL   COMP METABOLIC PANEL    Collection Time: 12/16/24  7:56 PM   Result Value Ref Range    Sodium 142 135 - 145 mmol/L    Potassium 4.3 3.6 - 5.5 mmol/L    Chloride 105 96 - 112 mmol/L    Co2 28 20 - 33 mmol/L    Anion Gap 9.0 7.0 - 16.0    Glucose 101 (H) 65 - 99 mg/dL    Bun 20 8 - 22 mg/dL    Creatinine 0.91 0.50 - 1.40 mg/dL    Calcium 9.0 8.5 - 10.5 mg/dL    Correct Calcium 9.2 8.5 - 10.5 mg/dL    AST(SGOT) 36 12 - 45 U/L    ALT(SGPT) 24 2 - 50 U/L    Alkaline Phosphatase 77 30 - 99 U/L    Total Bilirubin 0.3 0.1 - 1.5 mg/dL    Albumin 3.8 3.2 - 4.9 g/dL    Total Protein 6.5 6.0 - 8.2 g/dL    Globulin 2.7 1.9 - 3.5 g/dL    A-G Ratio 1.4 g/dL   ESTIMATED GFR    Collection Time: 12/16/24  7:56 PM   Result Value Ref Range    GFR (CKD-EPI) 96 >60 mL/min/1.73 m 2        RADIOLOGY/PROCEDURES   The attending emergency physician has independently interpreted the diagnostic imaging associated with this visit and am waiting the final reading from the radiologist.     My preliminary interpretation is a follows: ER MD is reviewed the patient's x-ray.  No obvious fracture.    Radiologist interpretation:  DX-FOOT-COMPLETE 3+ LEFT   Final Result         1.  No acute traumatic bony injury.      DX-OS CALCIS (HEEL) 2+ LEFT   Final Result         1.  Normal calcaneus radiographs.          COURSE & MEDICAL DECISION MAKING     ASSESSMENT, COURSE AND PLAN  Care Narrative: Patient presents to the ER on a legal hold from [facility].  The patient endorses suicidal ideation.  He is also expressing paranoid delusions.  He is expressed concern that the MICHELLE is trying to  "kill him.  He also endorses \"practicing and says that people are chasing him and trying to kill him.  Upon my evaluation he says he plans on killing himself via a \"[employer] plan\" and says that he does not have control over this plan but is going to kill him in the 1000 other people.  Patient denies homicidal ideation other than he states that he wants to kill people that want to kill him, noting that \"that is in the thousands a day.\"  Patient has been evaluated by behavioral health team.  They feel patient needs to remain on a legal hold.  Patient's only complaint is some bruising to his heel after jumping off of a wall.  X-rays are negative for fracture.  Lab work is unremarkable.  Vital signs are stable.  He denies any recent illnesses.  At this time I think the patient is medically stable for psychiatric evaluation and transfer to inpatient psychiatric facility.    6:54 PM - Patient seen and examined at bedside. This is a 59 year old man who was brought in via EMS on a legal hold from [facility] Services triage. Per report, the patient presented there with complaints of suicidal ideation and paranoid thoughts, such as that the MICHELLE is trying to kill him, cult practicing, people chasing him trying to kill him. On my examination, he describes that he has thought of harming himself. He states that he has the \"eugenics plan\" but he does not have control of this plan. The patient also reports that he has a broken heel after jumping off the walls yesterday. He states that he jumped about 12 to 14 feet. He is walking on that foot. The patient states that he thinks he broke his heel and he is requesting imaging. Discussed plan of care, including performing lab work and imaging. Patient agrees to the plan of care. Ordered for POC Breathalizer, Urine Drug Screen, CMP, CBC w/ Diff., DX-Os Calcis (Heel) (Left) and DX-Foot (Left) to evaluate his symptoms.      8:18 PM - Spoke with Sukhdev (Behavioral " Health) about the patient's condition. The patient will remain in the ED for further observation and transfer.     8:58 PM - Spoke with Sukhdev (Behavioral Health).     Patient was placed in ED observation status at 2015 on December 16, 2024 as patient will need further psychiatric evaluation and inpatient psychiatric care.    ADDITIONAL PROBLEM LIST  Problem #1: Suicidal ideation and psychosis     DISPOSITION AND DISCUSSIONS  I have discussed management of the patient with the following physicians and LILLIAM's:  None.    Discussion of management with other [facility] or appropriate source(s): Behavioral Health Sukhdev      Escalation of care considered, and ultimately not performed: diagnostic imaging.  No evidence of calcaneal fracture.  No complaints of new or different back pain.  No tenderness along the T-spine or L-spine.  No need for imaging of the spine.    Barriers to care at this time, including but not limited to: Patient does not have established PCP.     Decision tools and prescription drugs considered including, but not limited to: If patient becomes aggressive then he may need B-52 as needed.      FINAL DIAGNOSIS  1. Acute psychosis (HCC) Acute   2. Suicidal ideation Acute   3. Contusion of foot or heel, left, initial encounter Acute      Triny GARCIA (Scribe), am scribing for, and in the presence of, Mell Coker M.D..    Electronically signed by: Triny Maurer (Scribe), 12/16/2024    Mell GARCIA M.D. personally performed the services described in this documentation, as scribed by Triny Maurer in my presence, and it is both accurate and complete.     This dictation has been created using voice recognition software. The accuracy of the dictation is limited by the abilities of the software. I expect there may be some errors of grammar and possibly content. I made every attempt to manually correct the errors within my dictation. However, errors related to voice  recognition software may still exist and should be interpreted within the appropriate context.      The note accurately reflects work and decisions made by me.  Mell Coker M.D.  12/16/2024  9:51 PM

## 2024-12-17 NOTE — ED NOTES
Report received from Saranya SAM, assumed care of patient. Pt resting comfortably on gurney.  Respirations even and unlabored.  Bed in lowest position.  Sitter directly outside of room with full view of Pt.

## 2024-12-17 NOTE — ED NOTES
Pt resting, airway patent, rr even and unlabored, equal chest rise and fall. NAD noted. 1:1 sitter within line of sight.

## 2024-12-26 ENCOUNTER — HOSPITAL ENCOUNTER (EMERGENCY)
Facility: MEDICAL CENTER | Age: 59
End: 2024-12-26
Attending: EMERGENCY MEDICINE
Payer: MEDICARE

## 2024-12-26 VITALS
WEIGHT: 158.73 LBS | DIASTOLIC BLOOD PRESSURE: 80 MMHG | HEART RATE: 86 BPM | BODY MASS INDEX: 23.51 KG/M2 | HEIGHT: 69 IN | SYSTOLIC BLOOD PRESSURE: 148 MMHG | RESPIRATION RATE: 20 BRPM | OXYGEN SATURATION: 96 % | TEMPERATURE: 97 F

## 2024-12-26 DIAGNOSIS — Z59.00 HOMELESSNESS: ICD-10-CM

## 2024-12-26 DIAGNOSIS — R45.1 AGITATION: ICD-10-CM

## 2024-12-26 PROCEDURE — 80307 DRUG TEST PRSMV CHEM ANLYZR: CPT

## 2024-12-26 PROCEDURE — 302970 POC BREATHALIZER

## 2024-12-26 PROCEDURE — 302970 POC BREATHALIZER: Performed by: EMERGENCY MEDICINE

## 2024-12-26 PROCEDURE — 90791 PSYCH DIAGNOSTIC EVALUATION: CPT

## 2024-12-26 PROCEDURE — 99285 EMERGENCY DEPT VISIT HI MDM: CPT

## 2024-12-26 SDOH — ECONOMIC STABILITY - HOUSING INSECURITY: HOMELESSNESS UNSPECIFIED: Z59.00

## 2024-12-26 ASSESSMENT — PAIN DESCRIPTION - PAIN TYPE: TYPE: OTHER (COMMENT)

## 2024-12-26 ASSESSMENT — FIBROSIS 4 INDEX: FIB4 SCORE: 1.49

## 2024-12-26 NOTE — CONSULTS
ALERT team  note:  ED Consult to Behavioral Health completed by [facility] ED Alert team RNGlenis

## 2024-12-26 NOTE — ED NOTES
Pt. Started shouting and screaming at Psych staff when they were in attempting to speak with him.  Security on standby.  Called ERP for orders.

## 2024-12-26 NOTE — ED NOTES
Pt. Escorted out of ED by security with all belongings.  Pt. Ambulates with steady gait.  Continually shouting upon discharge.

## 2024-12-26 NOTE — ED PROVIDER NOTES
"ED Provider Note    CHIEF COMPLAINT  Chief Complaint   Patient presents with    Psych Eval     Not taking any meds, hx anxiety/OCD/depression, interested in behavioral health tx, stating self harm/suicidal ideation with thoughts/attempt via stabbing       EXTERNAL RECORDS REVIEWED  Reviewed recent ER visits including laboratory and imaging studies    HPI/ROS  LIMITATION TO HISTORY   Patient is a poor historian possibly psychotic  OUTSIDE HISTORIAN(S):  EMS and police provided additional HPI    Tomy Desai is a 59 y.o. male who presents evaluation of chronic agitation and anxiety without suicidality or homicidal ideation.  The patient was apparently just recently at [place] [facility].  He is well-known to life skills as he is quite manipulative.  He was agitated and screaming but then becomes quite lucid and quiet.  He is oriented x 4.  He does admit to using using marijuana but no hard drugs or alcohol.  He denies any high fevers or chills.  He denies auditory or visual hallucinations.    PAST MEDICAL HISTORY   has a past medical history of Psychiatric disorder.    SURGICAL HISTORY   has a past surgical history that includes no pertinent past surgical history.    FAMILY HISTORY  History reviewed. No pertinent family history.    SOCIAL HISTORY  Social History     Tobacco Use    Smoking status: Every Day     Types: Cigarettes    Smokeless tobacco: Never   Vaping Use    Vaping status: Never Used   Substance and Sexual Activity    Alcohol use: Yes     Comment: \"couple jiggers of fine whiskey\"    Drug use: Yes     Types: Inhaled     Comment: marijuana \"when I can\"    Sexual activity: Not on file       CURRENT MEDICATIONS  Home Medications       Reviewed by Debbi Pimentel R.N. (Registered Nurse) on 12/26/24 at 1021  Med List Status: Partial     Medication Last Dose Status        Patient Ilia Taking any Medications                           ALLERGIES  No Known Allergies    PHYSICAL EXAM  VITAL SIGNS: BP " "(!) 157/82   Pulse 91   Temp 35.8 °C (96.5 °F) (Temporal)   Resp 18   Ht 1.753 m (5' 9\")   Wt 72 kg (158 lb 11.7 oz)   SpO2 94%   BMI 23.44 kg/m²    Pulse ox interpretation: I interpret this pulse ox as normal.  Constitutional: Alert and oriented x 3, no acute distress  HEENT: Atraumatic normocephalic, pupils are equal round reactive to light extraocular movements are intact. The nares is clear, external ears are normal, mouth shows moist mucous membranes normal dentition for age  Neck: Supple, no JVD no tracheal deviation  Cardiovascular: Regular rate and rhythm no murmur rub or gallop 2+ pulses peripherally x4  Thorax & Lungs: No respiratory distress, no wheezes rales or rhonchi, No chest tenderness.   GI: Soft nontender nondistended positive bowel sounds, no peritoneal signs no rebound or guarding  Skin: Warm dry no acute rash or lesion  Musculoskeletal: Moving all extremities with full range and 5 of 5 strength no acute  deformity  Neurologic: Cranial nerves III through XII are grossly intact no sensory deficit no cerebellar dysfunction following commands no ataxia no focal neurological deficits  Psychiatric: Patient has some tangential thinking at times but is lucid.  He is oriented x 4.  Has what appears to be capacity.  Does not appear to be acutely psychotic or suicidal or intoxicated          EKG/LABS  Results for orders placed or performed during the hospital encounter of 12/26/24   POC BREATHALIZER    Collection Time: 12/26/24 10:36 AM   Result Value Ref Range    POC Breathalizer 0.00 0.00 - 0.01 Percent   Urine Drug Screen    Collection Time: 12/26/24 11:31 AM   Result Value Ref Range    Amphetamines Urine Negative Negative    Barbiturates Negative Negative    Benzodiazepines Negative Negative    Cocaine Metabolite Negative Negative    Fentanyl, Urine Negative Negative    Methadone Negative Negative    Opiates Negative Negative    Oxycodone Negative Negative    Phencyclidine -Pcp Negative Negative "    Propoxyphene Negative Negative    Cannabinoid Metab Positive (A) Negative        RADIOLOGY/PROCEDURES   No imaging emergently indicated  COURSE & MEDICAL DECISION MAKING    ASSESSMENT, COURSE AND PLAN  Care Narrative:     This is a very challenging 59-year-old gentleman who is known to our facility as well as our life skills nurse Nu.  She apparently knows him quite well.  When I examined the patient he was verbally accosting myself and the nurse but then he was quite directable.  He is lucid and appears to have medical decision making capability.  Nursing staff initially placed him on a legal hold and filled out page 1.  I do long talk with Nu and she knows the patient quite well.  I evaluated the patient multiple times at the bedside and he appears to be more manipulative and aggressive than suicidal or psychotic.  He does not have any obvious toxidrome on physical exam or vital signs and is alcohol and toxicology screen is negative other than THC.  Patient apparently has been hospitalized with psychiatry in the past but does not meet criteria for legal hold.  Once we were able to verbally de-escalate him he was quite pleasant, lucid and cooperative and went to the bathroom by himself.  He has refused any further intervention and I feel comfortable discharging him to his own care          ADDITIONAL PROBLEMS MANAGED      DISPOSITION AND DISCUSSIONS  I have discussed management of the patient with the following physicians and LILLIAM's: None    Discussion of management with other [facility] or appropriate source(s): Discussed plan of care with life skills    Escalation of care considered, and ultimately not performed: Considered legal hold    Barriers to care at this time, including but not limited to: No PCP, homeless.     Decision tools and prescription drugs considered including, but not limited to: None.    FINAL DIAGNOSIS  1. Agitation        2. Homelessness               Electronically signed by: Gregorio REYES  YUE Jansen, 12/26/2024 10:51 AM

## 2024-12-26 NOTE — ED TRIAGE NOTES
"Chief Complaint   Patient presents with    Psych Eval     Not taking any meds, hx anxiety/OCD/depression, interested in behavioral health tx, stating self harm/suicidal ideation with thoughts/attempt via stabbing        Ambulated to triage for above complaint. Excessive talking.    SI protocols ordered.     Charge updated, pt roomed to [place] [id number].     BP (!) 157/82   Pulse 91   Temp 35.8 °C (96.5 °F) (Temporal)   Resp 18   Ht 1.753 m (5' 9\")   Wt 72 kg (158 lb 11.7 oz)   SpO2 94%   BMI 23.44 kg/m²      "

## 2024-12-26 NOTE — ED NOTES
Received bedside report from CECY Jasso to assume care of pt. At this time.  Pt. Resting on gurney with eyes closed.  Reparations even, non-labored.  Room stripped for safety.  1:1 sitter in direct line of sight.

## 2024-12-26 NOTE — ED NOTES
Report from triage RN. Pt changed into paper scrubs, all belongings removed and placed in locker # 32, 1:1 sitter in place with stop sign filled out by sitter and RN.       Pt cooperative and allowed to have juice and linen. Education provided on safety and pt able to demonstrate safe behavior.     Pt with bright affect, pacing in room, with pressured speech. AAOx4 GCS 15.

## 2024-12-26 NOTE — CONSULTS
"[facility]   TRIAGE ASSESSMENT    Name: Tomy Desai  MRN: [id number]  : 1965  Age: 59 y.o.  Date of assessment: 2024  PCP: Pcp Pt States None  Persons in attendance: Patient    CHIEF COMPLAINT/PRESENTING ISSUE (as stated by pt):   Chief Complaint   Patient presents with    Psych Eval     Not taking any meds, hx anxiety/OCD/depression, interested in behavioral health tx, stating self harm/suicidal ideation with thoughts/attempt via stabbing   Pt on hold, pt in paper scrub, laying on bed at start of assessment. Pt refuses to answer when asked why he is here, states \"it's in the report\" Pt has bizarre, illogical thought process, states he \"has options to be genocidal, homicidal, suicidal or fungicidal. There are 2 BD down there by [address] in the 2 hours I have been here and 2 more attempts and it is on you if there is more if you don't get me to [facility] on a 2 or 4 week hold\" Pt's agitation increases when this nurse educates pt on legal hold process and placement if referred out for inpatient [facility] care. Pt moves to sitting on edge of bed and gets very animated and pointing at this nurse.   Pt states \"Just get me a bed at [facility], and I can get a snack and be all comfy cozy there, you know what I mean.\"     Pt denies outpatient care or current medications.    CURRENT LIVING SITUATION/SOCIAL SUPPORT: Denies to answer    BEHAVIORAL HEALTH TREATMENT HISTORY  Does patient/parent report a history of prior behavioral health treatment for patient?   Yes:    Dates Level of Care Facilty/Provider Diagnosis/Problem Medications   2024 Inpatient [facility] Anxiety/ Depression    2024 Inpatient [facility]     2017, 2016 Inpatient [facility] Psychosis Risperdal, Haldol, Ativan, Benadryl            SAFETY ASSESSMENT - SELF  Does patient acknowledge, parent/significant other report, or presenting problem suggest risk of dangerousness to self? Yes:     Past Current    Suicidal Thoughts: [x]  [x] "    Suicidal Plans: []  [x]    Suicidal Intent: []  []    Suicide Attempts: []  []    Self-Injury []  []      For any boxes checked above, provide detail: Denies to elaborate with this nurse    History of suicide by family member: Does not answer  History of suicide by friend/significant other: Does not answer  Recent change in frequency/specificity/intensity of suicidal thoughts or self-harm behavior? Does not answer  Current access to firearms, medications, or other identified means of suicide/self-harm? Does not answer  If yes, willing to restrict access to means of suicide/self-harm? Does not answer    Protective factors present:  Does not answer     SAFETY ASSESSMENT - OTHERS  Does patient acknowledge, parent/significant other report, or presenting problem suggest risk of dangerousness to others? Yes:    History Current   Thoughts of injuring others? []  [x]    Threats to injure others? []  [x]    Plan to injure others? []  []    Intent to injure others? []  []    Has injured others? []  []    Thoughts of killing others? []  [x]    Threats to kill others? []  [x]    Plans to kill others? []  []    Intent to kill others? []  []    Has killed others? []  []    Perpetrator of sexual assault? []  []    Family history of homicide? []  []      For any boxes checked above, please provide detail: Pt making statements to this nurse that he is a danger to society if does not get a bed    Recent change in frequency/specificity/intensity of thoughts or threats to harm others? Does not answer  Current access to firearms/other identified means of harm? Does not answer  If yes, willing to restrict access to weapons/means of harm? Does not answer  Protective factors present: Does not answer  Based on information provided during the current assessment, is a mandated “duty to warn” being exercised? Does not answer    ABUSE/NEGLECT SCREENING  Does patient report feeling “unsafe” in his/her home, or afraid of anyone?  Does not  "answer  Does patient report any history of physical, sexual, or emotional abuse?  Does not answer  Does parent or significant other report any of the above? Does not answer  Is there evidence of neglect by self?  Does not answer  Is there evidence of neglect by a caregiver? Does not answer  Does the patient/parent report any history of CPS/APS/police involvement related to suspected abuse/neglect or domestic violence? Does not answer    Based on the information provided during the current assessment, is a mandated report of suspected abuse/neglect being made?  No    SUBSTANCE USE SCREENING  Does patient acknowledge, parent/significant other report, or presenting problem or UDS/Breathalyzer suggest any alcohol or substance use within the past 30 days:   Yes:  Shemar all substances used in the past 30 days:      Last Use Amount   []   Alcohol     [x]   Marijuana Does not answer    []   Heroin     []   Prescription Opioids  (used without prescription, for    recreation, or in excess of prescribed amount)     []   Other Prescription  (used without prescription, for    recreation, or in excess of prescribed amount)     []   Cocaine      []   Methamphetamine     []   \"\" drugs (ectasy, MDMA)     []   Other substances        UDS results: Pos. Marijuana  Breathalyzer results: 0.0    What consequences does the patient associate with any of the above substance use and or addictive behaviors? Does not answer      LEGAL HISTORY  Has the patient ever been involved with juvenile, adult, or family legal systems? Does not answer    MENTAL STATUS   Participation: Verbally monopolizing and    Grooming: Disheveled  Orientation: Alert and Fully Oriented  Behavior: Agitated and verbally aggressive    Eye contact: Limited  Mood: Irritable  Affect: Angry  Thought process: Goal-directed and Flight of ideas  Thought content: Obsessions  Speech: Loud, Pressured, and Hypertalkative  Perception: Within normal limits  Memory:  No gross " evidence of memory deficits  Insight: Limited  Judgment:  Limited  Other:    Collateral information:   Source:  [] Significant other present in person:   [] Significant other by telephone  [] [facility]   [] [facility] Nursing Staff  [x] [facility]  [] Other:     [] Unable to complete full assessment due to:  [] Acute intoxication  [x] Patient declined to participate/engage  [] Patient verbally unresponsive  [] Significant cognitive deficits  [] Significant perceptual distortions or behavioral disorganization  [x] Other: Pt became too agitated to complete assessment    CLINICAL IMPRESSIONS:  Primary:  Verbally aggressive behaviors  Secondary:         IDENTIFIED NEEDS/PLAN:       []   Imminent safety risk - self []  Imminent safety risk - others   []   Acute substance withdrawl []   Psychosis/Impaired reality testing   []   Mood/anxiety [x]   Substance use/Addictive behavior   []   Maladaptive behaviro []   Parent/child conflict   []   Family/Couples conflict []   Biomedical   [x]   Housing []   Financial   []    Legal []   Occupational/Educational   []   Domestic violence []   Other:     Disposition:  Discontinue legal hold, pt will be given resources for community outpatient programs, medication management and support groups.      Does patient express agreement with the above plan? Yes    Crisis Safety Plan completed and copy given to patient? Yes    Referral appointment(s) scheduled? N\A     Alert team only:   I have discussed findings and recommendations with Dr. Lerma who is in agreement with these recommendations.       Glenis Du R.N.  12/26/2024

## 2024-12-28 ENCOUNTER — HOSPITAL ENCOUNTER (EMERGENCY)
Facility: MEDICAL CENTER | Age: 59
End: 2024-12-28
Attending: STUDENT IN AN ORGANIZED HEALTH CARE EDUCATION/TRAINING PROGRAM
Payer: MEDICARE

## 2024-12-28 VITALS
DIASTOLIC BLOOD PRESSURE: 69 MMHG | BODY MASS INDEX: 22.5 KG/M2 | TEMPERATURE: 97 F | WEIGHT: 151.9 LBS | RESPIRATION RATE: 16 BRPM | OXYGEN SATURATION: 95 % | HEIGHT: 69 IN | HEART RATE: 96 BPM | SYSTOLIC BLOOD PRESSURE: 141 MMHG

## 2024-12-28 DIAGNOSIS — K40.90 LEFT INGUINAL HERNIA: Primary | ICD-10-CM

## 2024-12-28 LAB
ALBUMIN SERPL BCP-MCNC: 3.5 G/DL (ref 3.2–4.9)
ALBUMIN/GLOB SERPL: 1.4 G/DL
ALP SERPL-CCNC: 70 U/L (ref 30–99)
ALT SERPL-CCNC: 23 U/L (ref 2–50)
ANION GAP SERPL CALC-SCNC: 12 MMOL/L (ref 7–16)
AST SERPL-CCNC: 28 U/L (ref 12–45)
BASOPHILS # BLD AUTO: 0.2 % (ref 0–1.8)
BASOPHILS # BLD: 0.01 K/UL (ref 0–0.12)
BILIRUB SERPL-MCNC: 0.3 MG/DL (ref 0.1–1.5)
BUN SERPL-MCNC: 23 MG/DL (ref 8–22)
CALCIUM ALBUM COR SERPL-MCNC: 8.8 MG/DL (ref 8.5–10.5)
CALCIUM SERPL-MCNC: 8.4 MG/DL (ref 8.5–10.5)
CHLORIDE SERPL-SCNC: 106 MMOL/L (ref 96–112)
CO2 SERPL-SCNC: 22 MMOL/L (ref 20–33)
CREAT SERPL-MCNC: 0.94 MG/DL (ref 0.5–1.4)
EOSINOPHIL # BLD AUTO: 0.03 K/UL (ref 0–0.51)
EOSINOPHIL NFR BLD: 0.5 % (ref 0–6.9)
ERYTHROCYTE [DISTWIDTH] IN BLOOD BY AUTOMATED COUNT: 43.5 FL (ref 35.9–50)
GFR SERPLBLD CREATININE-BSD FMLA CKD-EPI: 92 ML/MIN/1.73 M 2
GLOBULIN SER CALC-MCNC: 2.5 G/DL (ref 1.9–3.5)
GLUCOSE SERPL-MCNC: 108 MG/DL (ref 65–99)
HCT VFR BLD AUTO: 38.4 % (ref 42–52)
HGB BLD-MCNC: 13.1 G/DL (ref 14–18)
IMM GRANULOCYTES # BLD AUTO: 0.03 K/UL (ref 0–0.11)
IMM GRANULOCYTES NFR BLD AUTO: 0.5 % (ref 0–0.9)
LIPASE SERPL-CCNC: 37 U/L (ref 11–82)
LYMPHOCYTES # BLD AUTO: 0.49 K/UL (ref 1–4.8)
LYMPHOCYTES NFR BLD: 7.9 % (ref 22–41)
MCH RBC QN AUTO: 31.1 PG (ref 27–33)
MCHC RBC AUTO-ENTMCNC: 34.1 G/DL (ref 32.3–36.5)
MCV RBC AUTO: 91.2 FL (ref 81.4–97.8)
MONOCYTES # BLD AUTO: 0.32 K/UL (ref 0–0.85)
MONOCYTES NFR BLD AUTO: 5.1 % (ref 0–13.4)
NEUTROPHILS # BLD AUTO: 5.34 K/UL (ref 1.82–7.42)
NEUTROPHILS NFR BLD: 85.8 % (ref 44–72)
NRBC # BLD AUTO: 0 K/UL
NRBC BLD-RTO: 0 /100 WBC (ref 0–0.2)
PLATELET # BLD AUTO: 245 K/UL (ref 164–446)
PMV BLD AUTO: 9.1 FL (ref 9–12.9)
POTASSIUM SERPL-SCNC: 3.6 MMOL/L (ref 3.6–5.5)
PROT SERPL-MCNC: 6 G/DL (ref 6–8.2)
RBC # BLD AUTO: 4.21 M/UL (ref 4.7–6.1)
SODIUM SERPL-SCNC: 140 MMOL/L (ref 135–145)
WBC # BLD AUTO: 6.2 K/UL (ref 4.8–10.8)

## 2024-12-28 PROCEDURE — 83690 ASSAY OF LIPASE: CPT

## 2024-12-28 PROCEDURE — 80053 COMPREHEN METABOLIC PANEL: CPT

## 2024-12-28 PROCEDURE — 99284 EMERGENCY DEPT VISIT MOD MDM: CPT

## 2024-12-28 PROCEDURE — 85025 COMPLETE CBC W/AUTO DIFF WBC: CPT

## 2024-12-28 PROCEDURE — 36415 COLL VENOUS BLD VENIPUNCTURE: CPT

## 2024-12-28 ASSESSMENT — FIBROSIS 4 INDEX: FIB4 SCORE: 1.49

## 2024-12-29 NOTE — ED TRIAGE NOTES
"Chief Complaint   Patient presents with    Abdominal Pain     Has hernia that has been recurrent and has been causing pain. Pt reports being able to push it back in but it continues to pop out.      BP (!) 144/61   Pulse 97   Temp 35.9 °C (96.6 °F) (Temporal)   Resp 16   Ht 1.753 m (5' 9\")   Wt 68.9 kg (151 lb 14.4 oz)   SpO2 95%   BMI 22.43 kg/m²     Pt ambulatory to triage w/ above complaint. Abdominal pain protocol ordered.    Placed pt back w/ phlebotomy, educated on NPO status.     "

## 2024-12-29 NOTE — DISCHARGE INSTRUCTIONS
You are seen and evaluated in the emergency department for your hernia.  There is no evidence of incarcerated or strangulated hernia.  You do need to see a general surgeon on an outpatient basis to have this evaluated for possible surgical intervention.  I have placed a referral for you to establish with a general surgeon, please call the above surgical specialist to get earliest available appointments.  Also recommend using tight fitting underwear, over-the-counter Tylenol, anti-inflammatories for pain.

## 2024-12-29 NOTE — ED PROVIDER NOTES
"ER Provider Note    Scribed for Mika Kirk M.d. by Ginny Hendricks. 12/28/2024  9:14 PM    Primary Care Provider: Pcp Pt States None    CHIEF COMPLAINT   Chief Complaint   Patient presents with    Abdominal Pain     Has hernia that has been recurrent and has been causing pain. Pt reports being able to push it back in but it continues to pop out.      EXTERNAL RECORDS REVIEWED  Inpatient Notes Seen at [facility] multiple times  and Outpatient Notes Seen multiple times for psych evaluations and violent behavior.    HPI/ROS  LIMITATION TO HISTORY   Select: : None  OUTSIDE HISTORIAN(S):  None.    Tomy Desai is a 59 y.o. male who presents to the ED for evaluation of recurrent inguinal hernia onset 1.5 years ago. The patient reports the hernia is now a \"size of a lemon\" and has radiated to his scrotum. Denies vomiting. He explains he has been able to push the hernia in but states it continues to pop out. He notes he has regular bowel movements, noting he goes to the bathroom approximately 40 minutes after eating. Patient notes he has been taking THC for the pain. He denies having seen a surgeon for his hernia. The patient history of abdominal surgeries. No known drug allergies.      PAST MEDICAL HISTORY  Past Medical History:   Diagnosis Date    Psychiatric disorder        SURGICAL HISTORY  Past Surgical History:   Procedure Laterality Date    NO PERTINENT PAST SURGICAL HISTORY         FAMILY HISTORY  History reviewed. No pertinent family history.    SOCIAL HISTORY   reports that he has been smoking cigarettes. He has never used smokeless tobacco. He reports current alcohol use. He reports current drug use. Drug: Inhaled.    CURRENT MEDICATIONS  Previous Medications    No medications noted     ALLERGIES  Patient has no known allergies.    PHYSICAL EXAM  BP (!) 144/61   Pulse 97   Temp 35.9 °C (96.6 °F) (Temporal)   Resp 16   Ht 1.753 m (5' 9\")   Wt 68.9 kg (151 lb 14.4 oz)   SpO2 " 95%   BMI 22.43 kg/m²   Constitutional: Awake and alert  HENT: Normal inspection  Eyes: Normal inspection  Neck: Grossly normal range of motion.  Cardiovascular: Normal heart rate, Normal rhythm.  Symmetric peripheral pulses.   Thorax & Lungs: No respiratory distress, No wheezing, No rales, No rhonchi, No chest tenderness.   Abdomen: Bowel sounds normal, soft, non-distended, nontender, no mass, No palpable hernia  Skin: No obvious rash.  Back: No tenderness, No CVA tenderness.   Extremities: No clubbing, cyanosis, edema, no Homans or cords.  : No testicular swelling or redness, intact cremaster reflexes bilaterally.  Neurologic: Grossly normal   Psychiatric: Normal for situation    DIAGNOSTIC STUDIES    LABS  Labs Reviewed   CBC WITH DIFFERENTIAL - Abnormal; Notable for the following components:       Result Value    RBC 4.21 (*)     Hemoglobin 13.1 (*)     Hematocrit 38.4 (*)     Neutrophils-Polys 85.80 (*)     Lymphocytes 7.90 (*)     Lymphs (Absolute) 0.49 (*)     All other components within normal limits   COMP METABOLIC PANEL - Abnormal; Notable for the following components:    Glucose 108 (*)     Bun 23 (*)     Calcium 8.4 (*)     All other components within normal limits   LIPASE   ESTIMATED GFR     COURSE & MEDICAL DECISION MAKING     ASSESSMENT, COURSE AND PLAN  Care Narrative:     9:14 PM - Patient seen and examined at bedside. Patient presents with inguinal hernia.  On exam patient does not have any evidence of incarcerated or strangulated hernia.  His hernia is easily reducible at the bedside.  Patient not having any testicular pain, swelling, there is intact cremasteric reflex.  There is no evidence of any rashes.  Patient not having any obstructive symptoms, having normal bowel movements, denies any nausea or vomiting.  Discussed plan of care, including obtaining labs. Patient agrees to the plan of care. Ordered for Lipase, CMP, and CBC w/diff to evaluate his symptoms.     Labs reviewed showed  largely normal metabolic panel, no significant abnormalities.    10:17 PM - I reevaluated the patient at bedside. I discussed the patient's diagnostic study results.  Frontal diagnosis considered.  Patient has right inguinal hernia that is easily reducible, will refer to general surgery.  No need for CT imaging at this time.  Patient remained hemodynamically stable, was tolerating orals here in the emergency department.  I discussed plan for discharge and follow up as outlined below. The patient is stable for discharge at this time and will return for any new or worsening symptoms.  Referral will be written for general surgery evaluation.  Patient verbalizes understanding and support with my plan for discharge.         ADDITIONAL PROBLEM LIST  None    DISPOSITION AND DISCUSSIONS  I have discussed management of the patient with the following physicians and LILLIAM's:  None.    Discussion of management with other Q or appropriate source(s): None     Escalation of care considered, and ultimately not performed: diagnostic imaging and acute inpatient care management, however at this time, the patient is most appropriate for outpatient management.    Barriers to care at this time, including but not limited to: Patient does not have established PCP.     The patient will return for new or worsening symptoms and is stable at the time of discharge.    The patient is referred to a primary physician for blood pressure management, diabetic screening, and for all other preventative health concerns.    DISPOSITION:  Patient will be discharged home in stable condition.    FOLLOW UP:  [facility]  [address]  [facility] [address]  [phone]    Schedule an appointment as soon as possible for a visit       FINAL DIAGNOSIS  1. Left inguinal hernia Acute     Ginny GARCIA), am scribing for, and in the presence of, Mika Kirk M.D..    Electronically signed by: Ginny Landers), 12/28/2024    Mika GARCIA  JAVED Kirk. personally performed the services described in this documentation, as scribed by Ginny Hendricks in my presence, and it is both accurate and complete.     The note accurately reflects work and decisions made by me.  Mika Kirk M.D.  12/29/2024  2:28 AM

## 2024-12-29 NOTE — ED NOTES
"RN went back to check on pt. Pt sleeping on sven stating \"I need to stay here tonight\". Pt reeducated that he is being discharged from the ED and informed that he cannot sleep in the room because there are other patients that need to be treated. Pt verbalized understanding. Pt provided discharge instructions and follow-up information. All questions answered. Pt ambulated from ED with steady gait carrying all belongings.  "

## 2024-12-29 NOTE — ED NOTES
"RN attempting to discharge pt, pt stating \"the doc is keeping me on a 72 hour observation for my hernia because my guts are spilling out\". Pt educated that he was safe for discharge and has not reducible hernia at this time ad must follow up with general surgery.   "
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
